# Patient Record
Sex: MALE | Race: WHITE | NOT HISPANIC OR LATINO | Employment: OTHER
[De-identification: names, ages, dates, MRNs, and addresses within clinical notes are randomized per-mention and may not be internally consistent; named-entity substitution may affect disease eponyms.]

---

## 2017-01-25 ENCOUNTER — RECORDS - HEALTHEAST (OUTPATIENT)
Dept: ADMINISTRATIVE | Facility: OTHER | Age: 72
End: 2017-01-25

## 2017-02-22 ENCOUNTER — AMBULATORY - HEALTHEAST (OUTPATIENT)
Dept: INTERNAL MEDICINE | Facility: CLINIC | Age: 72
End: 2017-02-22

## 2017-05-24 ENCOUNTER — AMBULATORY - HEALTHEAST (OUTPATIENT)
Dept: INTERNAL MEDICINE | Facility: CLINIC | Age: 72
End: 2017-05-24

## 2017-06-07 ENCOUNTER — AMBULATORY - HEALTHEAST (OUTPATIENT)
Dept: INTERNAL MEDICINE | Facility: CLINIC | Age: 72
End: 2017-06-07

## 2017-06-19 ENCOUNTER — AMBULATORY - HEALTHEAST (OUTPATIENT)
Dept: INTERNAL MEDICINE | Facility: CLINIC | Age: 72
End: 2017-06-19

## 2017-06-19 DIAGNOSIS — R06.00 DYSPNEA: ICD-10-CM

## 2017-06-19 DIAGNOSIS — E78.5 HYPERLIPIDEMIA: ICD-10-CM

## 2017-06-28 ENCOUNTER — HOSPITAL ENCOUNTER (OUTPATIENT)
Dept: CARDIOLOGY | Facility: HOSPITAL | Age: 72
Discharge: HOME | End: 2017-06-28
Attending: INTERNAL MEDICINE

## 2017-06-28 ENCOUNTER — HOSPITAL ENCOUNTER (OUTPATIENT)
Dept: NUCLEAR MEDICINE | Facility: HOSPITAL | Age: 72
Discharge: HOME | End: 2017-06-28
Attending: INTERNAL MEDICINE

## 2017-06-28 DIAGNOSIS — R06.00 DYSPNEA: ICD-10-CM

## 2017-06-28 LAB
CV STRESS CURRENT BP HE: NORMAL
CV STRESS CURRENT HR HE: 100
CV STRESS CURRENT HR HE: 100
CV STRESS CURRENT HR HE: 112
CV STRESS CURRENT HR HE: 113
CV STRESS CURRENT HR HE: 114
CV STRESS CURRENT HR HE: 120
CV STRESS CURRENT HR HE: 121
CV STRESS CURRENT HR HE: 124
CV STRESS CURRENT HR HE: 124
CV STRESS CURRENT HR HE: 127
CV STRESS CURRENT HR HE: 129
CV STRESS CURRENT HR HE: 140
CV STRESS CURRENT HR HE: 141
CV STRESS CURRENT HR HE: 141
CV STRESS CURRENT HR HE: 150
CV STRESS CURRENT HR HE: 152
CV STRESS CURRENT HR HE: 153
CV STRESS CURRENT HR HE: 153
CV STRESS CURRENT HR HE: 154
CV STRESS CURRENT HR HE: 154
CV STRESS CURRENT HR HE: 68
CV STRESS CURRENT HR HE: 77
CV STRESS CURRENT HR HE: 82
CV STRESS CURRENT HR HE: 83
CV STRESS CURRENT HR HE: 86
CV STRESS CURRENT HR HE: 87
CV STRESS CURRENT HR HE: 94
CV STRESS CURRENT HR HE: 97
CV STRESS CURRENT HR HE: 97
CV STRESS CURRENT HR HE: 99
CV STRESS CURRENT HR HE: 99
CV STRESS DEVIATION TIME HE: NORMAL
CV STRESS ECHO PERCENT HR HE: NORMAL
CV STRESS EXERCISE STAGE HE: NORMAL
CV STRESS EXERCISE STAGE REACHED HE: NORMAL
CV STRESS FINAL RESTING BP HE: NORMAL
CV STRESS FINAL RESTING HR HE: 82
CV STRESS MAX HR HE: 154
CV STRESS MAX TREADMILL GRADE HE: 16
CV STRESS MAX TREADMILL SPEED HE: 4.2
CV STRESS PEAK DIA BP HE: NORMAL
CV STRESS PEAK SYS BP HE: NORMAL
CV STRESS PHASE HE: NORMAL
CV STRESS PROTOCOL HE: NORMAL
CV STRESS RESTING PT POSITION HE: NORMAL
CV STRESS RESTING PT POSITION HE: NORMAL
CV STRESS ST DEVIATION AMOUNT HE: NORMAL
CV STRESS ST DEVIATION ELEVATION HE: NORMAL
CV STRESS ST EVELATION AMOUNT HE: NORMAL
CV STRESS TEST TYPE HE: NORMAL
CV STRESS TOTAL STAGE TIME MIN 1 HE: NORMAL
NUC STRESS EJECTION FRACTION: 69 %
STRESS ECHO BASELINE BP: NORMAL
STRESS ECHO BASELINE HR: 70
STRESS ECHO CALCULATED PERCENT HR: 102 %
STRESS ECHO LAST STRESS BP: NORMAL
STRESS ECHO LAST STRESS HR: 153
STRESS ECHO POST ESTIMATED WORKLOAD: 12.1
STRESS ECHO POST EXERCISE DUR MIN: 11
STRESS ECHO POST EXERCISE DUR SEC: 59
STRESS ECHO TARGET HR: 128

## 2017-07-12 ENCOUNTER — AMBULATORY - HEALTHEAST (OUTPATIENT)
Dept: INTERNAL MEDICINE | Facility: CLINIC | Age: 72
End: 2017-07-12

## 2017-07-12 DIAGNOSIS — Z00.00 PREVENTATIVE HEALTH CARE: ICD-10-CM

## 2017-08-02 ENCOUNTER — AMBULATORY - HEALTHEAST (OUTPATIENT)
Dept: LAB | Facility: CLINIC | Age: 72
End: 2017-08-02

## 2017-08-02 DIAGNOSIS — E78.5 HYPERLIPIDEMIA: ICD-10-CM

## 2017-08-02 DIAGNOSIS — Z00.00 PREVENTATIVE HEALTH CARE: ICD-10-CM

## 2017-08-02 LAB
CHOLEST SERPL-MCNC: 234 MG/DL
FASTING STATUS PATIENT QL REPORTED: NO
HDLC SERPL-MCNC: 65 MG/DL
LDLC SERPL CALC-MCNC: 148 MG/DL
TRIGL SERPL-MCNC: 107 MG/DL

## 2017-08-03 ENCOUNTER — COMMUNICATION - HEALTHEAST (OUTPATIENT)
Dept: INTERNAL MEDICINE | Facility: CLINIC | Age: 72
End: 2017-08-03

## 2017-08-03 ENCOUNTER — AMBULATORY - HEALTHEAST (OUTPATIENT)
Dept: INTERNAL MEDICINE | Facility: CLINIC | Age: 72
End: 2017-08-03

## 2017-09-11 ENCOUNTER — AMBULATORY - HEALTHEAST (OUTPATIENT)
Dept: INTERNAL MEDICINE | Facility: CLINIC | Age: 72
End: 2017-09-11

## 2017-09-26 ENCOUNTER — OFFICE VISIT - HEALTHEAST (OUTPATIENT)
Dept: CARDIOLOGY | Facility: CLINIC | Age: 72
End: 2017-09-26

## 2017-09-26 DIAGNOSIS — I10 ESSENTIAL HYPERTENSION WITH GOAL BLOOD PRESSURE LESS THAN 140/90: ICD-10-CM

## 2017-09-26 DIAGNOSIS — E78.00 HYPERCHOLESTEROLEMIA: ICD-10-CM

## 2017-09-26 ASSESSMENT — MIFFLIN-ST. JEOR: SCORE: 1492.89

## 2017-09-27 ENCOUNTER — AMBULATORY - HEALTHEAST (OUTPATIENT)
Dept: CARDIOLOGY | Facility: CLINIC | Age: 72
End: 2017-09-27

## 2017-09-27 ENCOUNTER — HOSPITAL ENCOUNTER (OUTPATIENT)
Dept: CT IMAGING | Facility: CLINIC | Age: 72
Discharge: HOME | End: 2017-09-27
Attending: INTERNAL MEDICINE

## 2017-09-27 DIAGNOSIS — E78.00 HYPERCHOLESTEROLEMIA: ICD-10-CM

## 2017-09-27 DIAGNOSIS — I25.10 CAD (CORONARY ARTERY DISEASE): ICD-10-CM

## 2017-09-27 DIAGNOSIS — E78.00 HYPERCHOLESTEREMIA: ICD-10-CM

## 2017-09-27 LAB
BSA FOR ECHO PROCEDURE: 1.91 M2
CV CALCIUM SCORE AGATSTON LM: 0
CV CALCIUM SCORING AGATSON LAD: 43
CV CALCIUM SCORING AGATSTON CX: 1
CV CALCIUM SCORING AGATSTON RCA: 46
CV CALCIUM SCORING AGATSTON TOTAL: 90

## 2017-09-27 ASSESSMENT — MIFFLIN-ST. JEOR: SCORE: 1488.35

## 2017-11-06 ENCOUNTER — AMBULATORY - HEALTHEAST (OUTPATIENT)
Dept: INTERNAL MEDICINE | Facility: CLINIC | Age: 72
End: 2017-11-06

## 2017-11-07 ENCOUNTER — OFFICE VISIT - HEALTHEAST (OUTPATIENT)
Dept: INTERNAL MEDICINE | Facility: CLINIC | Age: 72
End: 2017-11-07

## 2017-11-07 DIAGNOSIS — R20.8 DYSESTHESIA: ICD-10-CM

## 2017-11-07 DIAGNOSIS — I71.21 ANEURYSM, ASCENDING AORTA (H): ICD-10-CM

## 2017-11-07 DIAGNOSIS — K63.5 COLON POLYP: ICD-10-CM

## 2017-11-07 DIAGNOSIS — I10 HTN (HYPERTENSION): ICD-10-CM

## 2017-11-07 DIAGNOSIS — F51.04 CHRONIC INSOMNIA: ICD-10-CM

## 2017-11-07 DIAGNOSIS — M54.2 NECK PAIN: ICD-10-CM

## 2017-11-07 DIAGNOSIS — M25.512 SHOULDER PAIN, LEFT: ICD-10-CM

## 2017-11-07 DIAGNOSIS — Z83.79 FAMILY HISTORY OF GASTRIC ULCER: ICD-10-CM

## 2017-11-07 DIAGNOSIS — E78.5 HYPERLIPIDEMIA: ICD-10-CM

## 2017-11-07 DIAGNOSIS — C61 PROSTATE CANCER (H): ICD-10-CM

## 2017-11-07 LAB
CHOLEST SERPL-MCNC: 168 MG/DL
FASTING STATUS PATIENT QL REPORTED: YES
HDLC SERPL-MCNC: 54 MG/DL
LDLC SERPL CALC-MCNC: 98 MG/DL
TRIGL SERPL-MCNC: 79 MG/DL

## 2017-11-07 ASSESSMENT — MIFFLIN-ST. JEOR: SCORE: 1479.85

## 2017-11-13 ENCOUNTER — RECORDS - HEALTHEAST (OUTPATIENT)
Dept: ADMINISTRATIVE | Facility: OTHER | Age: 72
End: 2017-11-13

## 2017-12-28 ENCOUNTER — RECORDS - HEALTHEAST (OUTPATIENT)
Dept: ADMINISTRATIVE | Facility: OTHER | Age: 72
End: 2017-12-28

## 2018-03-01 ENCOUNTER — RECORDS - HEALTHEAST (OUTPATIENT)
Dept: ADMINISTRATIVE | Facility: OTHER | Age: 73
End: 2018-03-01

## 2018-04-04 ENCOUNTER — OFFICE VISIT - HEALTHEAST (OUTPATIENT)
Dept: INTERNAL MEDICINE | Facility: CLINIC | Age: 73
End: 2018-04-04

## 2018-04-04 DIAGNOSIS — F51.04 CHRONIC INSOMNIA: ICD-10-CM

## 2018-04-04 DIAGNOSIS — N52.9 ERECTILE DYSFUNCTION: ICD-10-CM

## 2018-04-04 DIAGNOSIS — G47.00 INSOMNIA: ICD-10-CM

## 2018-04-04 DIAGNOSIS — I10 ESSENTIAL HYPERTENSION: ICD-10-CM

## 2018-04-04 DIAGNOSIS — M25.559 HIP PAIN: ICD-10-CM

## 2018-04-04 DIAGNOSIS — R10.13 DYSPEPSIA: ICD-10-CM

## 2018-04-04 LAB
ANION GAP SERPL CALCULATED.3IONS-SCNC: 10 MMOL/L (ref 5–18)
BASOPHILS # BLD AUTO: 0.1 THOU/UL (ref 0–0.2)
BASOPHILS NFR BLD AUTO: 1 % (ref 0–2)
BUN SERPL-MCNC: 18 MG/DL (ref 8–28)
CALCIUM SERPL-MCNC: 9.3 MG/DL (ref 8.5–10.5)
CHLORIDE BLD-SCNC: 108 MMOL/L (ref 98–107)
CO2 SERPL-SCNC: 24 MMOL/L (ref 22–31)
CREAT SERPL-MCNC: 0.8 MG/DL (ref 0.7–1.3)
EOSINOPHIL # BLD AUTO: 0.1 THOU/UL (ref 0–0.4)
EOSINOPHIL NFR BLD AUTO: 1 % (ref 0–6)
ERYTHROCYTE [DISTWIDTH] IN BLOOD BY AUTOMATED COUNT: 12.2 % (ref 11–14.5)
GFR SERPL CREATININE-BSD FRML MDRD: >60 ML/MIN/1.73M2
GLUCOSE BLD-MCNC: 88 MG/DL (ref 70–125)
HCT VFR BLD AUTO: 38.2 % (ref 40–54)
HGB BLD-MCNC: 12.8 G/DL (ref 14–18)
LYMPHOCYTES # BLD AUTO: 1.5 THOU/UL (ref 0.8–4.4)
LYMPHOCYTES NFR BLD AUTO: 14 % (ref 20–40)
MCH RBC QN AUTO: 31 PG (ref 27–34)
MCHC RBC AUTO-ENTMCNC: 33.5 G/DL (ref 32–36)
MCV RBC AUTO: 93 FL (ref 80–100)
MONOCYTES # BLD AUTO: 0.9 THOU/UL (ref 0–0.9)
MONOCYTES NFR BLD AUTO: 9 % (ref 2–10)
NEUTROPHILS # BLD AUTO: 7.9 THOU/UL (ref 2–7.7)
NEUTROPHILS NFR BLD AUTO: 76 % (ref 50–70)
PLATELET # BLD AUTO: 254 THOU/UL (ref 140–440)
PMV BLD AUTO: 7.4 FL (ref 7–10)
POTASSIUM BLD-SCNC: 3.9 MMOL/L (ref 3.5–5)
RBC # BLD AUTO: 4.12 MILL/UL (ref 4.4–6.2)
SODIUM SERPL-SCNC: 142 MMOL/L (ref 136–145)
WBC: 10.4 THOU/UL (ref 4–11)

## 2018-04-05 LAB
ATRIAL RATE - MUSE: 73 BPM
DIASTOLIC BLOOD PRESSURE - MUSE: NORMAL MMHG
INTERPRETATION ECG - MUSE: NORMAL
P AXIS - MUSE: 39 DEGREES
PR INTERVAL - MUSE: 164 MS
QRS DURATION - MUSE: 94 MS
QT - MUSE: 380 MS
QTC - MUSE: 418 MS
R AXIS - MUSE: -5 DEGREES
SYSTOLIC BLOOD PRESSURE - MUSE: NORMAL MMHG
T AXIS - MUSE: 56 DEGREES
VENTRICULAR RATE- MUSE: 73 BPM

## 2018-06-04 ENCOUNTER — AMBULATORY - HEALTHEAST (OUTPATIENT)
Dept: INTERNAL MEDICINE | Facility: CLINIC | Age: 73
End: 2018-06-04

## 2018-06-22 ENCOUNTER — COMMUNICATION - HEALTHEAST (OUTPATIENT)
Dept: INTERNAL MEDICINE | Facility: CLINIC | Age: 73
End: 2018-06-22

## 2018-07-16 ENCOUNTER — AMBULATORY - HEALTHEAST (OUTPATIENT)
Dept: INTERNAL MEDICINE | Facility: CLINIC | Age: 73
End: 2018-07-16

## 2018-07-16 DIAGNOSIS — R25.2 MUSCLE CRAMP: ICD-10-CM

## 2018-07-20 ENCOUNTER — COMMUNICATION - HEALTHEAST (OUTPATIENT)
Dept: INTERNAL MEDICINE | Facility: CLINIC | Age: 73
End: 2018-07-20

## 2018-07-20 DIAGNOSIS — I10 ESSENTIAL HYPERTENSION: ICD-10-CM

## 2018-07-20 DIAGNOSIS — I71.21 THORACIC ASCENDING AORTIC ANEURYSM (H): ICD-10-CM

## 2018-08-23 ENCOUNTER — AMBULATORY - HEALTHEAST (OUTPATIENT)
Dept: INTERNAL MEDICINE | Facility: CLINIC | Age: 73
End: 2018-08-23

## 2018-08-23 DIAGNOSIS — M25.519 SHOULDER PAIN: ICD-10-CM

## 2018-08-28 ENCOUNTER — AMBULATORY - HEALTHEAST (OUTPATIENT)
Dept: CARDIOLOGY | Facility: CLINIC | Age: 73
End: 2018-08-28

## 2018-08-28 ENCOUNTER — RECORDS - HEALTHEAST (OUTPATIENT)
Dept: ADMINISTRATIVE | Facility: OTHER | Age: 73
End: 2018-08-28

## 2018-08-28 ENCOUNTER — COMMUNICATION - HEALTHEAST (OUTPATIENT)
Dept: SCHEDULING | Facility: CLINIC | Age: 73
End: 2018-08-28

## 2018-08-28 DIAGNOSIS — I77.810 MILD DILATION OF ASCENDING AORTA (H): ICD-10-CM

## 2018-09-04 ENCOUNTER — RECORDS - HEALTHEAST (OUTPATIENT)
Dept: ADMINISTRATIVE | Facility: OTHER | Age: 73
End: 2018-09-04

## 2018-09-05 ENCOUNTER — COMMUNICATION - HEALTHEAST (OUTPATIENT)
Dept: INTERNAL MEDICINE | Facility: CLINIC | Age: 73
End: 2018-09-05

## 2018-09-06 ENCOUNTER — OFFICE VISIT - HEALTHEAST (OUTPATIENT)
Dept: INTERNAL MEDICINE | Facility: CLINIC | Age: 73
End: 2018-09-06

## 2018-09-06 DIAGNOSIS — N20.0 NEPHROLITHIASIS: ICD-10-CM

## 2018-09-06 DIAGNOSIS — M75.100 ROTATOR CUFF TEAR: ICD-10-CM

## 2018-09-06 DIAGNOSIS — E78.00 HYPERCHOLESTEROLEMIA: ICD-10-CM

## 2018-09-06 DIAGNOSIS — I10 ESSENTIAL HYPERTENSION: ICD-10-CM

## 2018-09-06 LAB
ALBUMIN SERPL-MCNC: 3.9 G/DL (ref 3.5–5)
ANION GAP SERPL CALCULATED.3IONS-SCNC: 7 MMOL/L (ref 5–18)
BASOPHILS # BLD AUTO: 0.1 THOU/UL (ref 0–0.2)
BASOPHILS NFR BLD AUTO: 1 % (ref 0–2)
BUN SERPL-MCNC: 25 MG/DL (ref 8–28)
CALCIUM SERPL-MCNC: 9.5 MG/DL (ref 8.5–10.5)
CHLORIDE BLD-SCNC: 110 MMOL/L (ref 98–107)
CHOLEST SERPL-MCNC: 176 MG/DL
CO2 SERPL-SCNC: 25 MMOL/L (ref 22–31)
CREAT SERPL-MCNC: 0.79 MG/DL (ref 0.7–1.3)
EOSINOPHIL # BLD AUTO: 0.1 THOU/UL (ref 0–0.4)
EOSINOPHIL NFR BLD AUTO: 1 % (ref 0–6)
ERYTHROCYTE [DISTWIDTH] IN BLOOD BY AUTOMATED COUNT: 12.9 % (ref 11–14.5)
GFR SERPL CREATININE-BSD FRML MDRD: >60 ML/MIN/1.73M2
GLUCOSE BLD-MCNC: 100 MG/DL (ref 70–125)
HCT VFR BLD AUTO: 39.5 % (ref 40–54)
HGB BLD-MCNC: 13.3 G/DL (ref 14–18)
LYMPHOCYTES # BLD AUTO: 1.9 THOU/UL (ref 0.8–4.4)
LYMPHOCYTES NFR BLD AUTO: 24 % (ref 20–40)
MCH RBC QN AUTO: 31.2 PG (ref 27–34)
MCHC RBC AUTO-ENTMCNC: 33.7 G/DL (ref 32–36)
MCV RBC AUTO: 93 FL (ref 80–100)
MONOCYTES # BLD AUTO: 0.6 THOU/UL (ref 0–0.9)
MONOCYTES NFR BLD AUTO: 8 % (ref 2–10)
NEUTROPHILS # BLD AUTO: 5.5 THOU/UL (ref 2–7.7)
NEUTROPHILS NFR BLD AUTO: 67 % (ref 50–70)
PHOSPHATE SERPL-MCNC: 2.9 MG/DL (ref 2.5–4.5)
PLATELET # BLD AUTO: 285 THOU/UL (ref 140–440)
PMV BLD AUTO: 7.8 FL (ref 7–10)
POTASSIUM BLD-SCNC: 4.1 MMOL/L (ref 3.5–5)
RBC # BLD AUTO: 4.26 MILL/UL (ref 4.4–6.2)
SODIUM SERPL-SCNC: 142 MMOL/L (ref 136–145)
WBC: 8.2 THOU/UL (ref 4–11)

## 2018-09-06 ASSESSMENT — MIFFLIN-ST. JEOR: SCORE: 1470.95

## 2018-10-15 ENCOUNTER — AMBULATORY - HEALTHEAST (OUTPATIENT)
Dept: INTERNAL MEDICINE | Facility: CLINIC | Age: 73
End: 2018-10-15

## 2018-10-15 DIAGNOSIS — I25.10 CAD (CORONARY ARTERY DISEASE): ICD-10-CM

## 2018-10-15 DIAGNOSIS — I10 BENIGN ESSENTIAL HYPERTENSION: ICD-10-CM

## 2018-10-15 DIAGNOSIS — E78.00 HYPERCHOLESTEREMIA: ICD-10-CM

## 2018-11-05 ENCOUNTER — AMBULATORY - HEALTHEAST (OUTPATIENT)
Dept: INTERNAL MEDICINE | Facility: CLINIC | Age: 73
End: 2018-11-05

## 2018-11-05 DIAGNOSIS — M53.3 SACRAL PAIN: ICD-10-CM

## 2018-11-05 DIAGNOSIS — R20.8 DYSESTHESIA: ICD-10-CM

## 2018-11-05 DIAGNOSIS — M79.18 BUTTOCK PAIN: ICD-10-CM

## 2018-11-13 ENCOUNTER — AMBULATORY - HEALTHEAST (OUTPATIENT)
Dept: INTERNAL MEDICINE | Facility: CLINIC | Age: 73
End: 2018-11-13

## 2018-11-13 DIAGNOSIS — R10.2 PELVIC PAIN IN MALE: ICD-10-CM

## 2018-11-13 DIAGNOSIS — M54.10 RADICULAR LEG PAIN: ICD-10-CM

## 2018-12-03 ENCOUNTER — COMMUNICATION - HEALTHEAST (OUTPATIENT)
Dept: INTERNAL MEDICINE | Facility: CLINIC | Age: 73
End: 2018-12-03

## 2018-12-04 ENCOUNTER — RECORDS - HEALTHEAST (OUTPATIENT)
Dept: ADMINISTRATIVE | Facility: OTHER | Age: 73
End: 2018-12-04

## 2018-12-21 ENCOUNTER — AMBULATORY - HEALTHEAST (OUTPATIENT)
Dept: INTERNAL MEDICINE | Facility: CLINIC | Age: 73
End: 2018-12-21

## 2018-12-21 DIAGNOSIS — G47.00 INSOMNIA: ICD-10-CM

## 2018-12-21 DIAGNOSIS — F51.04 CHRONIC INSOMNIA: ICD-10-CM

## 2018-12-26 ENCOUNTER — COMMUNICATION - HEALTHEAST (OUTPATIENT)
Dept: INTERNAL MEDICINE | Facility: CLINIC | Age: 73
End: 2018-12-26

## 2018-12-26 DIAGNOSIS — F51.04 CHRONIC INSOMNIA: ICD-10-CM

## 2019-03-26 ENCOUNTER — AMBULATORY - HEALTHEAST (OUTPATIENT)
Dept: INTERNAL MEDICINE | Facility: CLINIC | Age: 74
End: 2019-03-26

## 2019-03-26 DIAGNOSIS — E78.00 HYPERCHOLESTEREMIA: ICD-10-CM

## 2019-03-26 DIAGNOSIS — I25.10 CAD (CORONARY ARTERY DISEASE): ICD-10-CM

## 2019-03-26 DIAGNOSIS — I10 ESSENTIAL HYPERTENSION: ICD-10-CM

## 2019-04-10 ENCOUNTER — AMBULATORY - HEALTHEAST (OUTPATIENT)
Dept: INTERNAL MEDICINE | Facility: CLINIC | Age: 74
End: 2019-04-10

## 2019-04-10 DIAGNOSIS — F51.04 CHRONIC INSOMNIA: ICD-10-CM

## 2019-04-10 DIAGNOSIS — G47.00 INSOMNIA: ICD-10-CM

## 2019-04-24 ENCOUNTER — RECORDS - HEALTHEAST (OUTPATIENT)
Dept: ADMINISTRATIVE | Facility: OTHER | Age: 74
End: 2019-04-24

## 2019-04-27 ENCOUNTER — AMBULATORY - HEALTHEAST (OUTPATIENT)
Dept: INTERNAL MEDICINE | Facility: CLINIC | Age: 74
End: 2019-04-27

## 2019-04-27 DIAGNOSIS — M16.11 PRIMARY OSTEOARTHRITIS OF RIGHT HIP: ICD-10-CM

## 2019-04-27 DIAGNOSIS — E78.00 HYPERCHOLESTEROLEMIA: ICD-10-CM

## 2019-04-27 DIAGNOSIS — I10 ESSENTIAL HYPERTENSION: ICD-10-CM

## 2019-04-27 DIAGNOSIS — Z85.46 HISTORY OF PROSTATE CANCER: ICD-10-CM

## 2019-04-29 ENCOUNTER — AMBULATORY - HEALTHEAST (OUTPATIENT)
Dept: INTERNAL MEDICINE | Facility: CLINIC | Age: 74
End: 2019-04-29

## 2019-04-29 DIAGNOSIS — I10 ESSENTIAL HYPERTENSION: ICD-10-CM

## 2019-04-30 ENCOUNTER — AMBULATORY - HEALTHEAST (OUTPATIENT)
Dept: LAB | Facility: CLINIC | Age: 74
End: 2019-04-30

## 2019-04-30 DIAGNOSIS — Z85.46 HISTORY OF PROSTATE CANCER: ICD-10-CM

## 2019-04-30 DIAGNOSIS — E78.00 HYPERCHOLESTEROLEMIA: ICD-10-CM

## 2019-04-30 DIAGNOSIS — I10 ESSENTIAL HYPERTENSION: ICD-10-CM

## 2019-04-30 LAB
ALBUMIN SERPL-MCNC: 4.1 G/DL (ref 3.5–5)
ALBUMIN UR-MCNC: ABNORMAL MG/DL
ALP SERPL-CCNC: 63 U/L (ref 45–120)
ALT SERPL W P-5'-P-CCNC: 14 U/L (ref 0–45)
ANION GAP SERPL CALCULATED.3IONS-SCNC: 8 MMOL/L (ref 5–18)
APPEARANCE UR: ABNORMAL
AST SERPL W P-5'-P-CCNC: 23 U/L (ref 0–40)
BACTERIA #/AREA URNS HPF: ABNORMAL HPF
BASOPHILS # BLD AUTO: 0 THOU/UL (ref 0–0.2)
BASOPHILS NFR BLD AUTO: 0 % (ref 0–2)
BILIRUB DIRECT SERPL-MCNC: 0.2 MG/DL
BILIRUB SERPL-MCNC: 0.5 MG/DL (ref 0–1)
BILIRUB UR QL STRIP: NEGATIVE
BUN SERPL-MCNC: 28 MG/DL (ref 8–28)
CALCIUM SERPL-MCNC: 10 MG/DL (ref 8.5–10.5)
CHLORIDE BLD-SCNC: 105 MMOL/L (ref 98–107)
CHOLEST SERPL-MCNC: 210 MG/DL
CO2 SERPL-SCNC: 28 MMOL/L (ref 22–31)
COLOR UR AUTO: ABNORMAL
CREAT SERPL-MCNC: 1 MG/DL (ref 0.7–1.3)
EOSINOPHIL # BLD AUTO: 0.1 THOU/UL (ref 0–0.4)
EOSINOPHIL NFR BLD AUTO: 2 % (ref 0–6)
ERYTHROCYTE [DISTWIDTH] IN BLOOD BY AUTOMATED COUNT: 13 % (ref 11–14.5)
ERYTHROCYTE [SEDIMENTATION RATE] IN BLOOD BY WESTERGREN METHOD: 3 MM/HR (ref 0–15)
FASTING STATUS PATIENT QL REPORTED: YES
GFR SERPL CREATININE-BSD FRML MDRD: >60 ML/MIN/1.73M2
GLUCOSE BLD-MCNC: 106 MG/DL (ref 70–125)
GLUCOSE UR STRIP-MCNC: NEGATIVE MG/DL
HCT VFR BLD AUTO: 43.1 % (ref 40–54)
HDLC SERPL-MCNC: 69 MG/DL
HGB BLD-MCNC: 14.4 G/DL (ref 14–18)
HGB UR QL STRIP: ABNORMAL
KETONES UR STRIP-MCNC: NEGATIVE MG/DL
LDLC SERPL CALC-MCNC: 127 MG/DL
LEUKOCYTE ESTERASE UR QL STRIP: NEGATIVE
LYMPHOCYTES # BLD AUTO: 2.6 THOU/UL (ref 0.8–4.4)
LYMPHOCYTES NFR BLD AUTO: 35 % (ref 20–40)
MCH RBC QN AUTO: 31.4 PG (ref 27–34)
MCHC RBC AUTO-ENTMCNC: 33.4 G/DL (ref 32–36)
MCV RBC AUTO: 94 FL (ref 80–100)
MONOCYTES # BLD AUTO: 0.7 THOU/UL (ref 0–0.9)
MONOCYTES NFR BLD AUTO: 10 % (ref 2–10)
MUCOUS THREADS #/AREA URNS LPF: ABNORMAL LPF
NEUTROPHILS # BLD AUTO: 3.9 THOU/UL (ref 2–7.7)
NEUTROPHILS NFR BLD AUTO: 53 % (ref 50–70)
NITRATE UR QL: NEGATIVE
PH UR STRIP: 6 [PH] (ref 4.5–8)
PLATELET # BLD AUTO: 306 THOU/UL (ref 140–440)
PMV BLD AUTO: 9.6 FL (ref 8.5–12.5)
POTASSIUM BLD-SCNC: 4.1 MMOL/L (ref 3.5–5)
PROT SERPL-MCNC: 6.7 G/DL (ref 6–8)
PSA SERPL-MCNC: <0.1 NG/ML (ref 0–6.5)
RBC # BLD AUTO: 4.58 MILL/UL (ref 4.4–6.2)
RBC #/AREA URNS AUTO: >100 HPF
SODIUM SERPL-SCNC: 141 MMOL/L (ref 136–145)
SP GR UR STRIP: 1.01 (ref 1–1.03)
SQUAMOUS #/AREA URNS AUTO: ABNORMAL LPF
TRIGL SERPL-MCNC: 71 MG/DL
TSH SERPL DL<=0.005 MIU/L-ACNC: 1.83 UIU/ML (ref 0.3–5)
UROBILINOGEN UR STRIP-ACNC: ABNORMAL
WBC #/AREA URNS AUTO: ABNORMAL HPF
WBC: 7.4 THOU/UL (ref 4–11)

## 2019-05-02 ENCOUNTER — COMMUNICATION - HEALTHEAST (OUTPATIENT)
Dept: INTERNAL MEDICINE | Facility: CLINIC | Age: 74
End: 2019-05-02

## 2019-05-08 ENCOUNTER — OFFICE VISIT - HEALTHEAST (OUTPATIENT)
Dept: INTERNAL MEDICINE | Facility: CLINIC | Age: 74
End: 2019-05-08

## 2019-05-08 DIAGNOSIS — M16.11 OSTEOARTHRITIS OF RIGHT HIP, UNSPECIFIED OSTEOARTHRITIS TYPE: ICD-10-CM

## 2019-05-08 DIAGNOSIS — E78.00 HYPERCHOLESTEREMIA: ICD-10-CM

## 2019-05-08 DIAGNOSIS — Z01.818 PREOPERATIVE EXAMINATION: ICD-10-CM

## 2019-05-08 DIAGNOSIS — N20.0 NEPHROLITHIASIS: ICD-10-CM

## 2019-05-08 DIAGNOSIS — I10 ESSENTIAL HYPERTENSION: ICD-10-CM

## 2019-05-08 DIAGNOSIS — G47.00 INSOMNIA, UNSPECIFIED TYPE: ICD-10-CM

## 2019-05-08 DIAGNOSIS — R31.29 HEMATURIA, MICROSCOPIC: ICD-10-CM

## 2019-05-08 LAB
ALBUMIN UR-MCNC: NEGATIVE MG/DL
APPEARANCE UR: CLEAR
ATRIAL RATE - MUSE: 71 BPM
BILIRUB UR QL STRIP: NEGATIVE
COLOR UR AUTO: YELLOW
DIASTOLIC BLOOD PRESSURE - MUSE: NORMAL MMHG
GLUCOSE UR STRIP-MCNC: NEGATIVE MG/DL
HGB UR QL STRIP: NEGATIVE
INTERPRETATION ECG - MUSE: NORMAL
KETONES UR STRIP-MCNC: NEGATIVE MG/DL
LEUKOCYTE ESTERASE UR QL STRIP: NEGATIVE
NITRATE UR QL: NEGATIVE
P AXIS - MUSE: 74 DEGREES
PH UR STRIP: 7 [PH] (ref 5–8)
PR INTERVAL - MUSE: 170 MS
QRS DURATION - MUSE: 94 MS
QT - MUSE: 390 MS
QTC - MUSE: 423 MS
R AXIS - MUSE: -17 DEGREES
SP GR UR STRIP: 1.01 (ref 1–1.03)
SYSTOLIC BLOOD PRESSURE - MUSE: NORMAL MMHG
T AXIS - MUSE: 57 DEGREES
UROBILINOGEN UR STRIP-ACNC: NORMAL
VENTRICULAR RATE- MUSE: 71 BPM

## 2019-05-08 ASSESSMENT — MIFFLIN-ST. JEOR: SCORE: 1479.85

## 2019-07-12 ENCOUNTER — AMBULATORY - HEALTHEAST (OUTPATIENT)
Dept: ADMINISTRATIVE | Facility: REHABILITATION | Age: 74
End: 2019-07-12

## 2019-07-12 ENCOUNTER — RECORDS - HEALTHEAST (OUTPATIENT)
Dept: ADMINISTRATIVE | Facility: OTHER | Age: 74
End: 2019-07-12

## 2019-07-12 DIAGNOSIS — R41.89 COGNITIVE DECLINE: ICD-10-CM

## 2019-07-12 DIAGNOSIS — I63.9 RIGHT SIDED CEREBRAL INFARCTION (H): ICD-10-CM

## 2019-07-12 DIAGNOSIS — M54.16 RIGHT LUMBAR RADICULOPATHY: ICD-10-CM

## 2019-07-23 ENCOUNTER — OFFICE VISIT - HEALTHEAST (OUTPATIENT)
Dept: PHYSICAL THERAPY | Facility: REHABILITATION | Age: 74
End: 2019-07-23

## 2019-07-23 DIAGNOSIS — M25.551 RIGHT HIP PAIN: ICD-10-CM

## 2019-07-23 DIAGNOSIS — M54.16 RIGHT LUMBAR RADICULOPATHY: ICD-10-CM

## 2019-07-26 ENCOUNTER — HOSPITAL ENCOUNTER (OUTPATIENT)
Dept: CARDIOLOGY | Facility: CLINIC | Age: 74
Discharge: HOME | End: 2019-07-26
Attending: PSYCHIATRY & NEUROLOGY

## 2019-07-26 DIAGNOSIS — R41.89 COGNITIVE DECLINE: ICD-10-CM

## 2019-07-26 DIAGNOSIS — I63.9 RIGHT SIDED CEREBRAL INFARCTION (H): ICD-10-CM

## 2019-07-26 DIAGNOSIS — M54.16 RIGHT LUMBAR RADICULOPATHY: ICD-10-CM

## 2019-07-26 ASSESSMENT — MIFFLIN-ST. JEOR: SCORE: 1479.85

## 2019-07-29 LAB
AORTIC VALVE MEAN VELOCITY: 111 CM/S
AR DECEL SLOPE: 1470 MM/S2
AR PEAK VELOCITY: 403 CM/S
AV DIMENSIONLESS INDEX VTI: 0.9
AV MEAN GRADIENT: 5 MMHG
AV PEAK GRADIENT: 8.6 MMHG
AV REGURGITANT PEAK GRADIENT: 65 MMHG
AV REGURGITATION PRESSURE HALF TIME: 802 MS
AV VALVE AREA: 2.8 CM2
AV VELOCITY RATIO: 0.9
BSA FOR ECHO PROCEDURE: 1.9 M2
CV BLOOD PRESSURE: ABNORMAL MMHG
CV ECHO HEIGHT: 68.8 IN
CV ECHO WEIGHT: 165 LBS
DOP CALC AO PEAK VEL: 147 CM/S
DOP CALC AO VTI: 32.5 CM
DOP CALC LVOT AREA: 3.14 CM2
DOP CALC LVOT DIAMETER: 2 CM
DOP CALC LVOT PEAK VEL: 131 CM/S
DOP CALC LVOT STROKE VOLUME: 92 CM3
DOP CALCLVOT PEAK VEL VTI: 29.3 CM
EJECTION FRACTION: 53 % (ref 55–75)
FRACTIONAL SHORTENING: 29.1 % (ref 28–44)
INTERVENTRICULAR SEPTUM IN END DIASTOLE: 0.96 CM (ref 0.6–1)
IVS/PW RATIO: 1.1
LA AREA 1: 12.5 CM2
LA AREA 2: 14 CM2
LEFT ATRIUM LENGTH: 4.4 CM
LEFT ATRIUM SIZE: 2.9 CM
LEFT ATRIUM VOLUME INDEX: 17.8 ML/M2
LEFT ATRIUM VOLUME: 33.8 ML
LEFT VENTRICLE DIASTOLIC VOLUME INDEX: 41.9 CM3/M2 (ref 34–74)
LEFT VENTRICLE DIASTOLIC VOLUME: 79.7 CM3 (ref 62–150)
LEFT VENTRICLE MASS INDEX: 57.9 G/M2
LEFT VENTRICLE SYSTOLIC VOLUME INDEX: 19.8 CM3/M2 (ref 11–31)
LEFT VENTRICLE SYSTOLIC VOLUME: 37.7 CM3 (ref 21–61)
LEFT VENTRICULAR INTERNAL DIMENSION IN DIASTOLE: 3.98 CM (ref 4.2–5.8)
LEFT VENTRICULAR INTERNAL DIMENSION IN SYSTOLE: 2.82 CM (ref 2.5–4)
LEFT VENTRICULAR MASS: 110.1 G
LEFT VENTRICULAR OUTFLOW TRACT MEAN GRADIENT: 5 MMHG
LEFT VENTRICULAR OUTFLOW TRACT MEAN VELOCITY: 103 CM/S
LEFT VENTRICULAR OUTFLOW TRACT PEAK GRADIENT: 7 MMHG
LEFT VENTRICULAR POSTERIOR WALL IN END DIASTOLE: 0.85 CM (ref 0.6–1)
LV STROKE VOLUME INDEX: 48.4 ML/M2
MITRAL VALVE E/A RATIO: 0.7
MV AVERAGE E/E' RATIO: 8.7 CM/S
MV DECELERATION TIME: 306 MS
MV E'TISSUE VEL-LAT: 6.87 CM/S
MV E'TISSUE VEL-MED: 5.13 CM/S
MV LATERAL E/E' RATIO: 7.6
MV MEDIAL E/E' RATIO: 10.2
MV PEAK A VELOCITY: 75.2 CM/S
MV PEAK E VELOCITY: 52.4 CM/S
NUC REST DIASTOLIC VOLUME INDEX: 2640 LBS
NUC REST SYSTOLIC VOLUME INDEX: 68.75 IN
TRICUSPID REGURGITATION PEAK PRESSURE GRADIENT: 19.9 MMHG
TRICUSPID VALVE ANULAR PLANE SYSTOLIC EXCURSION: 2.1 CM
TRICUSPID VALVE PEAK REGURGITANT VELOCITY: 223 CM/S

## 2019-07-31 ENCOUNTER — RECORDS - HEALTHEAST (OUTPATIENT)
Dept: LAB | Facility: CLINIC | Age: 74
End: 2019-07-31

## 2019-07-31 LAB
FOLATE SERPL-MCNC: 13.9 NG/ML
LYME TOTAL ANTIBODY - HISTORICAL: 0.03 INDEX VALUE
TSH SERPL DL<=0.005 MIU/L-ACNC: 1.49 UIU/ML (ref 0.3–5)
VIT B12 SERPL-MCNC: 297 PG/ML (ref 213–816)

## 2019-08-02 LAB — METHYLMALONATE SERPL-SCNC: 0.15 UMOL/L (ref 0–0.4)

## 2019-08-08 ENCOUNTER — RECORDS - HEALTHEAST (OUTPATIENT)
Dept: ADMINISTRATIVE | Facility: OTHER | Age: 74
End: 2019-08-08

## 2019-10-18 ENCOUNTER — AMBULATORY - HEALTHEAST (OUTPATIENT)
Dept: INTERNAL MEDICINE | Facility: CLINIC | Age: 74
End: 2019-10-18

## 2019-10-18 DIAGNOSIS — G47.00 INSOMNIA: ICD-10-CM

## 2019-10-18 DIAGNOSIS — E78.00 HYPERCHOLESTEREMIA: ICD-10-CM

## 2019-10-18 DIAGNOSIS — I10 ESSENTIAL HYPERTENSION: ICD-10-CM

## 2019-10-18 DIAGNOSIS — N52.9 ERECTILE DYSFUNCTION, UNSPECIFIED ERECTILE DYSFUNCTION TYPE: ICD-10-CM

## 2019-10-18 DIAGNOSIS — M54.2 NECK PAIN: ICD-10-CM

## 2019-10-18 DIAGNOSIS — F51.04 CHRONIC INSOMNIA: ICD-10-CM

## 2019-10-18 DIAGNOSIS — I25.10 CAD (CORONARY ARTERY DISEASE): ICD-10-CM

## 2019-10-18 RX ORDER — ATORVASTATIN CALCIUM 40 MG/1
40 TABLET, FILM COATED ORAL DAILY
Qty: 90 TABLET | Refills: 3 | Status: SHIPPED | OUTPATIENT
Start: 2019-10-18 | End: 2022-06-02

## 2019-10-18 RX ORDER — SILDENAFIL 100 MG/1
100 TABLET, FILM COATED ORAL PRN
Qty: 20 TABLET | Refills: 1 | Status: SHIPPED | OUTPATIENT
Start: 2019-10-18 | End: 2022-06-02 | Stop reason: ALTCHOICE

## 2019-10-31 ENCOUNTER — RECORDS - HEALTHEAST (OUTPATIENT)
Dept: ADMINISTRATIVE | Facility: OTHER | Age: 74
End: 2019-10-31

## 2019-11-06 ENCOUNTER — HOSPITAL ENCOUNTER (OUTPATIENT)
Dept: ULTRASOUND IMAGING | Facility: CLINIC | Age: 74
Discharge: HOME | End: 2019-11-06
Attending: SPECIALIST

## 2019-11-06 ENCOUNTER — COMMUNICATION - HEALTHEAST (OUTPATIENT)
Dept: TELEHEALTH | Facility: CLINIC | Age: 74
End: 2019-11-06

## 2019-11-06 DIAGNOSIS — R10.31 RIGHT GROIN PAIN: ICD-10-CM

## 2019-11-06 DIAGNOSIS — Z98.890 HX OF HERNIA REPAIR: ICD-10-CM

## 2019-11-06 DIAGNOSIS — Z87.19 HX OF HERNIA REPAIR: ICD-10-CM

## 2019-12-03 ENCOUNTER — RECORDS - HEALTHEAST (OUTPATIENT)
Dept: ADMINISTRATIVE | Facility: OTHER | Age: 74
End: 2019-12-03

## 2020-03-16 ENCOUNTER — RECORDS - HEALTHEAST (OUTPATIENT)
Dept: ADMINISTRATIVE | Facility: OTHER | Age: 75
End: 2020-03-16

## 2020-03-16 ENCOUNTER — RECORDS - HEALTHEAST (OUTPATIENT)
Dept: LAB | Facility: CLINIC | Age: 75
End: 2020-03-16

## 2020-03-16 LAB
ALBUMIN SERPL-MCNC: 3.8 G/DL (ref 3.5–5)
ALP SERPL-CCNC: 58 U/L (ref 45–120)
ALT SERPL W P-5'-P-CCNC: 11 U/L (ref 0–45)
ANION GAP SERPL CALCULATED.3IONS-SCNC: 12 MMOL/L (ref 5–18)
AST SERPL W P-5'-P-CCNC: 20 U/L (ref 0–40)
BILIRUB SERPL-MCNC: 0.6 MG/DL (ref 0–1)
BUN SERPL-MCNC: 27 MG/DL (ref 8–28)
CALCIUM SERPL-MCNC: 9.4 MG/DL (ref 8.5–10.5)
CHLORIDE BLD-SCNC: 106 MMOL/L (ref 98–107)
CHOLEST SERPL-MCNC: 191 MG/DL
CO2 SERPL-SCNC: 23 MMOL/L (ref 22–31)
CREAT SERPL-MCNC: 0.93 MG/DL (ref 0.7–1.3)
FASTING STATUS PATIENT QL REPORTED: YES
GFR SERPL CREATININE-BSD FRML MDRD: >60 ML/MIN/1.73M2
GLUCOSE BLD-MCNC: 90 MG/DL (ref 70–125)
HDLC SERPL-MCNC: 65 MG/DL
LDLC SERPL CALC-MCNC: 97 MG/DL
POTASSIUM BLD-SCNC: 4.1 MMOL/L (ref 3.5–5)
PROT SERPL-MCNC: 5.9 G/DL (ref 6–8)
PSA SERPL-MCNC: <0.1 NG/ML (ref 0–6.5)
SODIUM SERPL-SCNC: 141 MMOL/L (ref 136–145)
TRIGL SERPL-MCNC: 143 MG/DL
VIT B12 SERPL-MCNC: 309 PG/ML (ref 213–816)

## 2020-04-15 ENCOUNTER — AMBULATORY - HEALTHEAST (OUTPATIENT)
Dept: INTERNAL MEDICINE | Facility: CLINIC | Age: 75
End: 2020-04-15

## 2020-04-15 DIAGNOSIS — G47.00 INSOMNIA: ICD-10-CM

## 2020-04-15 DIAGNOSIS — F51.04 CHRONIC INSOMNIA: ICD-10-CM

## 2020-04-15 RX ORDER — ALPRAZOLAM 0.5 MG
TABLET ORAL
Qty: 90 TABLET | Refills: 1 | Status: SHIPPED | OUTPATIENT
Start: 2020-04-15 | End: 2022-06-02

## 2020-05-21 ENCOUNTER — AMBULATORY - HEALTHEAST (OUTPATIENT)
Dept: INTERNAL MEDICINE | Facility: CLINIC | Age: 75
End: 2020-05-21

## 2020-05-21 ENCOUNTER — COMMUNICATION - HEALTHEAST (OUTPATIENT)
Dept: INTERNAL MEDICINE | Facility: CLINIC | Age: 75
End: 2020-05-21

## 2020-05-21 DIAGNOSIS — M25.561 CHRONIC PAIN OF RIGHT KNEE: ICD-10-CM

## 2020-05-21 DIAGNOSIS — G89.29 CHRONIC PAIN OF RIGHT KNEE: ICD-10-CM

## 2020-05-21 DIAGNOSIS — M54.16 LUMBAR RADICULOPATHY: ICD-10-CM

## 2020-05-26 ENCOUNTER — COMMUNICATION - HEALTHEAST (OUTPATIENT)
Dept: INTERNAL MEDICINE | Facility: CLINIC | Age: 75
End: 2020-05-26

## 2020-05-27 ENCOUNTER — RECORDS - HEALTHEAST (OUTPATIENT)
Dept: ADMINISTRATIVE | Facility: OTHER | Age: 75
End: 2020-05-27

## 2020-06-04 ENCOUNTER — RECORDS - HEALTHEAST (OUTPATIENT)
Dept: ADMINISTRATIVE | Facility: OTHER | Age: 75
End: 2020-06-04

## 2020-06-04 ENCOUNTER — TELEPHONE (OUTPATIENT)
Dept: OTHER | Facility: CLINIC | Age: 75
End: 2020-06-04

## 2020-06-04 NOTE — TELEPHONE ENCOUNTER
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 Pagan re: potential vascular abnormality, issues with right anterior tibial.   Compartment pressures are normal.   Would like to have imaging either tomorrow afternoon or Tuesday afternoon.    Will discuss with Dr. Pagan.   Usually we have consult prior to imaging.     Addendum: Referral order received via fax from Dr. Valdes noting \"consult with Dr. Pagan, negative compartment testing results, right anterior 15, left anterior 12.     ADRIANA Moore, RN  [facility]     "

## 2020-06-04 NOTE — TELEPHONE ENCOUNTER
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 Magdiel Valdes @ [facility].    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  He has had a 6 to 7-week history of intermittent marked swelling and pain over his right anterior compartment.  This has occurred after exercise but no trauma.    The compartment is visually swollen when the pain occurs.  Last time he applied ice for 20 minutes and the swelling did resolve.    This occurred again and he called Dr. Ramirez who is an orthopedic colleague.  He sent him a photo of the swollen calf and he was very concerned that he had developed acute compartment syndrome.  He recommended compartment testing.  Patient did ice his calf after his episode and did not exercise compartment test at [facility] which was normal being 15 mmHg on the right and 20 mmHg on the left.      He has ongoing issues with this and wanted to discuss the possibility of this being related to popliteal artery entrapment syndrome.  He does have the low back issues but this would be a very unusual presentation of the symptoms.    Usually popliteal artery entrapment syndrome is in a very much younger patient who is very athletic.  However, patient does remain very athletic and thus this is still possible even at his age.  I explained the testing that we did to try to see if there is any signs of extensive compression of the neurovascular structures which could cause swelling and discomfort.  We recommended KATIE with exercise along with dynamic evaluation of the popliteal structures.  We will schedule this to be performed in our office in the near future though testing is somewhat delayed to the COVID-19 ongoing pandemic.       Paulie Pagan MD

## 2020-06-10 ENCOUNTER — HOSPITAL ENCOUNTER (OUTPATIENT)
Dept: ULTRASOUND IMAGING | Facility: CLINIC | Age: 75
End: 2020-06-10
Attending: SURGERY
Payer: COMMERCIAL

## 2020-06-10 DIAGNOSIS — M79.605 BILATERAL LEG PAIN: ICD-10-CM

## 2020-06-10 DIAGNOSIS — I77.89 POPLITEAL ARTERY ENTRAPMENT SYNDROME (H): ICD-10-CM

## 2020-06-10 DIAGNOSIS — M79.604 BILATERAL LEG PAIN: ICD-10-CM

## 2020-06-10 DIAGNOSIS — R09.89 OTHER SPECIFIED SYMPTOMS AND SIGNS INVOLVING THE CIRCULATORY AND RESPIRATORY SYSTEMS: ICD-10-CM

## 2020-06-10 DIAGNOSIS — I77.89 POPLITEAL ARTERY ENTRAPMENT SYNDROME (H): Primary | ICD-10-CM

## 2020-06-10 PROCEDURE — 93924 LWR XTR VASC STDY BILAT: CPT

## 2020-06-10 PROCEDURE — 93925 LOWER EXTREMITY STUDY: CPT

## 2020-06-10 PROCEDURE — 93970 EXTREMITY STUDY: CPT

## 2020-06-15 ENCOUNTER — TELEPHONE (OUTPATIENT)
Dept: OTHER | Facility: CLINIC | Age: 75
End: 2020-06-15

## 2020-06-15 NOTE — RESULT ENCOUNTER NOTE
Called patient with results.  This test along with the arterial testing does not reveal any evidence of extrinsic popliteal artery entrapment syndrome.  Discussed with patient.       Paulie Pagan MD

## 2020-06-15 NOTE — TELEPHONE ENCOUNTER
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  He notices left calf swelling more in the anterior lateral compartment associated with activity such as walking and running.  This is been relatively consistent.  Compartment pressures were normal.    We did ABIs with exercise along with dynamic popliteal artery and vein duplex evaluation with maneuvers which were all completely normal.  This does essentially rule out extrinsic popliteal artery entrapment syndrome and I certainly would not recommend surgical treatment such as division of the plantaris muscle or soleus fascia.    He did have an epidural for his chronic back issues and noticed that the left leg pain did improve but the swelling still comes and goes with activities.    He is forwarding me pictures which definitely show the swelling in the left calf after his activity.    We discussed the possibility of performing fasciotomies of the left anterior lateral compartments to relieve the swelling.  This is treating the symptom the swelling but not necessarily underlying cause but in several patients this has been helpful.  I do not feel that this will cause him harm but may or may not improve his symptoms but it would give the compartments more space.  This would be performed under general anesthetic as an outpatient.  He did resume activities as tolerated.    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  He will think about this further and will be in contact.       Paulie Pagan MD

## 2020-07-06 ENCOUNTER — TELEPHONE (OUTPATIENT)
Dept: NEUROLOGY | Facility: CLINIC | Age: 75
End: 2020-07-06

## 2020-07-06 DIAGNOSIS — G60.8 PERIPHERAL SENSORY-MOTOR AXONAL POLYNEUROPATHY: Primary | ICD-10-CM

## 2020-07-06 NOTE — TELEPHONE ENCOUNTER
,  He is one of your patients and would like to get an EMG again for his peroneal nerve right leg. Could you put orders in for this? If you need to call and talk to him he can be reached at [phone]. Thank you Nicole

## 2020-07-08 ENCOUNTER — RECORDS - HEALTHEAST (OUTPATIENT)
Dept: ADMINISTRATIVE | Facility: OTHER | Age: 75
End: 2020-07-08

## 2020-07-14 ENCOUNTER — RECORDS - HEALTHEAST (OUTPATIENT)
Dept: LAB | Facility: CLINIC | Age: 75
End: 2020-07-14

## 2020-07-14 LAB
ALBUMIN SERPL-MCNC: 3.8 G/DL (ref 3.5–5)
ALP SERPL-CCNC: 71 U/L (ref 45–120)
ALT SERPL W P-5'-P-CCNC: 14 U/L (ref 0–45)
ANION GAP SERPL CALCULATED.3IONS-SCNC: 10 MMOL/L (ref 5–18)
AST SERPL W P-5'-P-CCNC: 29 U/L (ref 0–40)
BILIRUB SERPL-MCNC: 0.4 MG/DL (ref 0–1)
BUN SERPL-MCNC: 20 MG/DL (ref 8–28)
CALCIUM SERPL-MCNC: 9.2 MG/DL (ref 8.5–10.5)
CHLORIDE BLD-SCNC: 109 MMOL/L (ref 98–107)
CO2 SERPL-SCNC: 24 MMOL/L (ref 22–31)
CREAT SERPL-MCNC: 1.01 MG/DL (ref 0.7–1.3)
GFR SERPL CREATININE-BSD FRML MDRD: >60 ML/MIN/1.73M2
GLUCOSE BLD-MCNC: 95 MG/DL (ref 70–125)
POTASSIUM BLD-SCNC: 4.1 MMOL/L (ref 3.5–5)
PROT SERPL-MCNC: 5.9 G/DL (ref 6–8)
SODIUM SERPL-SCNC: 143 MMOL/L (ref 136–145)
VIT B12 SERPL-MCNC: 366 PG/ML (ref 213–816)

## 2020-11-02 ENCOUNTER — AMBULATORY - HEALTHEAST (OUTPATIENT)
Dept: INTERNAL MEDICINE | Facility: CLINIC | Age: 75
End: 2020-11-02

## 2020-11-02 DIAGNOSIS — E88.810 METABOLIC SYNDROME: ICD-10-CM

## 2020-11-08 ENCOUNTER — HEALTH MAINTENANCE LETTER (OUTPATIENT)
Age: 75
End: 2020-11-08

## 2020-12-22 ENCOUNTER — RECORDS - HEALTHEAST (OUTPATIENT)
Dept: ADMINISTRATIVE | Facility: OTHER | Age: 75
End: 2020-12-22

## 2020-12-22 ENCOUNTER — RECORDS - HEALTHEAST (OUTPATIENT)
Dept: RADIOLOGY | Facility: CLINIC | Age: 75
End: 2020-12-22

## 2020-12-22 ENCOUNTER — AMBULATORY - HEALTHEAST (OUTPATIENT)
Dept: NEUROSURGERY | Facility: CLINIC | Age: 75
End: 2020-12-22

## 2020-12-29 ENCOUNTER — RECORDS - HEALTHEAST (OUTPATIENT)
Dept: LAB | Facility: CLINIC | Age: 75
End: 2020-12-29

## 2020-12-29 ENCOUNTER — OFFICE VISIT - HEALTHEAST (OUTPATIENT)
Dept: NEUROSURGERY | Facility: CLINIC | Age: 75
End: 2020-12-29

## 2020-12-29 DIAGNOSIS — M54.16 LUMBAR RADICULOPATHY: ICD-10-CM

## 2020-12-29 DIAGNOSIS — M43.16 SPONDYLOLISTHESIS OF LUMBAR REGION: ICD-10-CM

## 2020-12-29 LAB
ALBUMIN SERPL-MCNC: 4.2 G/DL (ref 3.5–5)
ALP SERPL-CCNC: 78 U/L (ref 45–120)
ALT SERPL W P-5'-P-CCNC: 17 U/L (ref 0–45)
ANION GAP SERPL CALCULATED.3IONS-SCNC: 10 MMOL/L (ref 5–18)
AST SERPL W P-5'-P-CCNC: 33 U/L (ref 0–40)
BILIRUB SERPL-MCNC: 0.5 MG/DL (ref 0–1)
BUN SERPL-MCNC: 29 MG/DL (ref 8–28)
CALCIUM SERPL-MCNC: 9.1 MG/DL (ref 8.5–10.5)
CHLORIDE BLD-SCNC: 104 MMOL/L (ref 98–107)
CO2 SERPL-SCNC: 24 MMOL/L (ref 22–31)
CREAT SERPL-MCNC: 1.11 MG/DL (ref 0.7–1.3)
ERYTHROCYTE [DISTWIDTH] IN BLOOD BY AUTOMATED COUNT: 13 % (ref 11–14.5)
GFR SERPL CREATININE-BSD FRML MDRD: >60 ML/MIN/1.73M2
GLUCOSE BLD-MCNC: 96 MG/DL (ref 70–125)
HCT VFR BLD AUTO: 40.8 % (ref 40–54)
HGB BLD-MCNC: 13.8 G/DL (ref 14–18)
MCH RBC QN AUTO: 30.7 PG (ref 27–34)
MCHC RBC AUTO-ENTMCNC: 33.8 G/DL (ref 32–36)
MCV RBC AUTO: 91 FL (ref 80–100)
PLATELET # BLD AUTO: 249 THOU/UL (ref 140–440)
PMV BLD AUTO: 10.6 FL (ref 8.5–12.5)
POTASSIUM BLD-SCNC: 4.2 MMOL/L (ref 3.5–5)
PROT SERPL-MCNC: 6.5 G/DL (ref 6–8)
RBC # BLD AUTO: 4.5 MILL/UL (ref 4.4–6.2)
SODIUM SERPL-SCNC: 138 MMOL/L (ref 136–145)
VIT B12 SERPL-MCNC: 480 PG/ML (ref 213–816)
WBC: 8.2 THOU/UL (ref 4–11)

## 2020-12-29 ASSESSMENT — MIFFLIN-ST. JEOR: SCORE: 1479.85

## 2020-12-30 ENCOUNTER — RECORDS - HEALTHEAST (OUTPATIENT)
Dept: LAB | Facility: CLINIC | Age: 75
End: 2020-12-30

## 2020-12-30 LAB — MAGNESIUM SERPL-MCNC: 1.9 MG/DL (ref 1.8–2.6)

## 2021-03-18 ENCOUNTER — OFFICE VISIT - HEALTHEAST (OUTPATIENT)
Dept: INTERNAL MEDICINE | Facility: CLINIC | Age: 76
End: 2021-03-18

## 2021-03-18 DIAGNOSIS — N52.8 OTHER MALE ERECTILE DYSFUNCTION: ICD-10-CM

## 2021-03-18 DIAGNOSIS — F41.9 ANXIETY: ICD-10-CM

## 2021-03-18 DIAGNOSIS — Z98.1 S/P CERVICAL SPINAL FUSION: ICD-10-CM

## 2021-03-18 DIAGNOSIS — F51.01 PRIMARY INSOMNIA: ICD-10-CM

## 2021-03-18 DIAGNOSIS — D64.9 NORMOCHROMIC ANEMIA: ICD-10-CM

## 2021-03-18 DIAGNOSIS — N20.0 CALCULUS OF KIDNEY: ICD-10-CM

## 2021-03-18 DIAGNOSIS — I10 ESSENTIAL HYPERTENSION: ICD-10-CM

## 2021-03-18 DIAGNOSIS — I73.00 RAYNAUD'S PHENOMENON WITHOUT GANGRENE: ICD-10-CM

## 2021-03-18 DIAGNOSIS — E78.00 HYPERCHOLESTEROLEMIA: ICD-10-CM

## 2021-03-18 DIAGNOSIS — M54.12 CERVICAL RADICULOPATHY: ICD-10-CM

## 2021-03-18 DIAGNOSIS — M54.2 NECK PAIN: ICD-10-CM

## 2021-03-18 DIAGNOSIS — Z98.1 S/P LUMBAR FUSION: ICD-10-CM

## 2021-03-18 DIAGNOSIS — I71.21 THORACIC ASCENDING AORTIC ANEURYSM (H): ICD-10-CM

## 2021-03-18 RX ORDER — TESTOSTERONE 1.62 MG/G
2 GEL TRANSDERMAL
Status: SHIPPED | COMMUNITY
Start: 2021-03-18 | End: 2022-06-02 | Stop reason: ALTCHOICE

## 2021-03-18 RX ORDER — LATANOPROST 50 UG/ML
1 SOLUTION/ DROPS OPHTHALMIC
Status: SHIPPED | COMMUNITY
Start: 2021-03-18 | End: 2023-10-04

## 2021-03-18 RX ORDER — LOSARTAN POTASSIUM 50 MG/1
50 TABLET ORAL DAILY
Qty: 90 TABLET | Refills: 3 | Status: SHIPPED | OUTPATIENT
Start: 2021-03-18 | End: 2022-02-18

## 2021-03-18 RX ORDER — TIMOLOL MALEATE 5 MG/ML
SOLUTION/ DROPS OPHTHALMIC
Status: SHIPPED | COMMUNITY
Start: 2021-03-02 | End: 2023-10-04

## 2021-03-30 ENCOUNTER — COMMUNICATION - HEALTHEAST (OUTPATIENT)
Dept: INTERNAL MEDICINE | Facility: CLINIC | Age: 76
End: 2021-03-30

## 2021-03-30 ENCOUNTER — RECORDS - HEALTHEAST (OUTPATIENT)
Dept: ADMINISTRATIVE | Facility: OTHER | Age: 76
End: 2021-03-30

## 2021-03-30 ENCOUNTER — HOSPITAL ENCOUNTER (OUTPATIENT)
Dept: CT IMAGING | Facility: CLINIC | Age: 76
Discharge: HOME | End: 2021-03-30
Attending: INTERNAL MEDICINE

## 2021-03-30 DIAGNOSIS — I71.21 THORACIC ASCENDING AORTIC ANEURYSM (H): ICD-10-CM

## 2021-03-30 LAB
CREAT BLD-MCNC: 1.2 MG/DL (ref 0.7–1.3)
GFR SERPL CREATININE-BSD FRML MDRD: 59 ML/MIN/1.73M2

## 2021-04-12 ENCOUNTER — RECORDS - HEALTHEAST (OUTPATIENT)
Dept: ADMINISTRATIVE | Facility: OTHER | Age: 76
End: 2021-04-12

## 2021-05-06 ENCOUNTER — RECORDS - HEALTHEAST (OUTPATIENT)
Dept: ADMINISTRATIVE | Facility: OTHER | Age: 76
End: 2021-05-06

## 2021-05-25 ENCOUNTER — RECORDS - HEALTHEAST (OUTPATIENT)
Dept: ADMINISTRATIVE | Facility: CLINIC | Age: 76
End: 2021-05-25

## 2021-05-26 ENCOUNTER — RECORDS - HEALTHEAST (OUTPATIENT)
Dept: ADMINISTRATIVE | Facility: CLINIC | Age: 76
End: 2021-05-26

## 2021-05-28 ENCOUNTER — RECORDS - HEALTHEAST (OUTPATIENT)
Dept: ADMINISTRATIVE | Facility: CLINIC | Age: 76
End: 2021-05-28

## 2021-05-28 NOTE — TELEPHONE ENCOUNTER
Patient notified. He has an appointment on 05/08/19 with PCP and he will recheck the urine then and discuss the CT at that time.    Jazzmine Rossi, CMA

## 2021-05-28 NOTE — TELEPHONE ENCOUNTER
----- Message from Derek Mccord MD sent at 5/1/2019  4:57 PM CDT -----  Call patient    Large amount of blood in the urine.  Microscopic  History of stones  CT 2017 showed stones  Will recheck UA  Consider recheck CT

## 2021-05-28 NOTE — PROGRESS NOTES
Preoperative Exam    Scheduled Procedure: R THR  Surgery Date:  05/09/19  Surgery Location: [place]    Surgeon:  Flip Frey    Assessment/Plan:     1. Preoperative examination  Healthy physician cleared to undergo general anesthesia/major surgery.  - Electrocardiogram Perform and Read    2. Osteoarthritis of right hip, unspecified osteoarthritis type       3. Essential hypertension  Excellent control    4. Hematuria, microscopic  Probably due to nephrolithiasis.  Recheck urinalysis negative.  Has upcoming urology visit later this summer   if has episode of gross hematuria clearly would need CT    - Urinalysis-UC if Indicated    5. Hypercholesteremia  On Rx    6. Nephrolithiasis  Multiple.  Asymptomatic.    7. Insomnia, unspecified type  On as needed Ambien          Recommendations  1.Patient approved for surgery with general or local anesthesia.  2.  Prophylactic antibiotic  3.  DVT prophylaxis per Ortho  4.  Recommend PPI for 2 weeks for stress gastritis prophylaxis in a  patient with history of peptic ulcer disease  5.  Hold BP meds a.m. of surgery  6.  Note Demerol morphine allergies.  Does tolerate oral Percocet        Dr. Frey thank you for this consultation.  If you have any questions or concerns please do not hesitate to contact me    Derek Mccord MD  Internal medicine  [facility]  [phone]  [email or web address]            Surgical Procedure Risk: Low (reported cardiac risk generally < 1%)  Have you had prior anesthesia?: Yes  Have you or any family members had a previous anesthesia reaction:  No  Do you or any family members have a history of a clotting or bleeding disorder?: Cardiac Risk Assessment: no increased risk for major cardiac complications    Functional Status: Independent  Patient plans to recover at home with family.     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  Patient is scheduled to have  elective right total hip replacement due to pain.  Loss of mobility.  He has had multiple surgeries in the past Without issue  Is no history of postop infection or DVT    He feels well        All other systems reviewed and are negative, other than those listed in the HPI.    History of hypertension.  Recent hydrochlorothiazide added  Hypertension-There are no cardiovascular, respiratory, neurologic complaints.No claudication.There is no orthostasis.Patient is compliant with medications.  Medications reviewed.   No side effects from medication.    Hyperlipoproteinemia-patient is tolerating medication.  There are no myalgia, arthralgia, weakness.  No Bowel issues.  Liver profile has been normal.  Patient has met cholesterol goals.        Pertinent History  Do you have difficulty breathing or chest pain after walking up a flight of stairs: No  History of obstructive sleep apnea: No  Steroid use in the last 6 months: Yes: Injection  Frequent Aspirin/NSAID use: Yes:    Prior Blood Transfusion: No  Prior Blood Transfusion Reaction: No  If for some reason prior to, during or after the procedure, if it is medically indicated, would you be willing to have a blood transfusion?:  There is no transfusion refusal.    Current Outpatient Medications   Medication Sig Dispense Refill     aspirin 81 MG EC tablet Take 81 mg by mouth daily.       atorvastatin (LIPITOR) 40 MG tablet Take 1 tablet (40 mg total) by mouth daily. 90 tablet 3     hydroCHLOROthiazide (HYDRODIURIL) 12.5 MG tablet Take 1 tablet (12.5 mg total) by mouth daily. 30 tablet 2     ibuprofen (ADVIL,MOTRIN) 200 MG tablet Take 600 mg by mouth every 6 (six) hours as needed.       losartan-hydrochlorothiazide (HYZAAR) 100-12.5 mg per tablet Take 1 tablet by mouth daily. 90 tablet 3     ALPRAZolam (XANAX) 0.5 MG tablet TAKE 1 TABLET BY MOUTH AS NEEDED FOR SLEEP OR ANXIETY 90 tablet 1     zolpidem (AMBIEN) 10 mg tablet TAKE 1 TABLET EVERY NIGHT AT BEDTIME AS NEEDED FOR  SLEEP 90 tablet 1     No current facility-administered medications for this visit.         Allergies   Allergen Reactions     Demerol [Meperidine] Nausea Only     Morphine (Pf) Nausea Only     Nitrous Oxide Nausea Only       Patient Active Problem List   Diagnosis     Calculus of ureter     Calculus of kidney     Essential hypertension     Hypercholesterolemia     Thoracic ascending aortic aneurysm (H)       Past Medical History:   Diagnosis Date     Agatston coronary artery calcium score less than 100 09/2017    score 90  2017     Cancer (H)     cancer, 15 yrs ago      Gastric ulcer      High cholesterol      Hyperlipidemia      Hypertension      Kidney stone      PONV (postoperative nausea and vomiting)     nausea with Nitrous     Thoracic ascending aortic aneurysm (H) 09/2017    4.1 cm on ct       Past Surgical History:   Procedure Laterality Date     CERVICAL SPINE SURGERY       CYSTOSCOPY W/ URETERAL STENT PLACEMENT Bilateral 4/25/2016    Procedure: CYSTOSCOPY, URETEROSCOPIC LASER LITHOTRIPSY, STENT INSERTION;  Surgeon: Lonnie Rosenbaum MD;  Location: [facility];  Service:      ND ANESTH,REPAIR UPPER ABD HERNIA NOS       SHOULDER SURGERY       URETEROSCOPY         Social History     Socioeconomic History     Marital status:      Spouse name: Not on file     Number of children: Not on file     Years of education: Not on file     Highest education level: Not on file   Occupational History     Occupation: MD     Employer: [employer]   Social Needs     Financial resource strain: Not on file     Food insecurity:     Worry: Not on file     Inability: Not on file     Transportation needs:     Medical: Not on file     Non-medical: Not on file   Tobacco Use     Smoking status: Never Smoker     Smokeless tobacco: Never Used     Tobacco comment: 2 cigars per month   Substance and Sexual Activity     Alcohol use: Yes     Comment: occas     Drug use: No     Sexual activity: Not on file  "  Lifestyle     Physical activity:     Days per week: Not on file     Minutes per session: Not on file     Stress: Not on file   Relationships     Social connections:     Talks on phone: Not on file     Gets together: Not on file     Attends Latter day service: Not on file     Active member of club or organization: Not on file     Attends meetings of clubs or organizations: Not on file     Relationship status: Not on file     Intimate partner violence:     Fear of current or ex partner: Not on file     Emotionally abused: Not on file     Physically abused: Not on file     Forced sexual activity: Not on file   Other Topics Concern     Not on file   Social History Narrative     Not on file       Patient Care Team:  Derek Mccord MD as PCP - General (Internal Medicine)          Objective:     Vitals:    05/08/19 1202   BP: 120/78   Pulse: 73   SpO2: 99%   Weight: 165 lb (74.8 kg)   Height: 5' 8.75\" (1.746 m)     Pleasant healthy-appearing man    Physical Exam:  Skin: Normal. No rash or lesion  Lymph Nodes: None palpable-including neck, axilla, inguinal, epitrochlear.  Head:  Normocephalic.    Eyes: Midline.  Equal size., full ROM.  External exams normal.  No icterus  Ears:  Normal pinnae, canals, and TM's.    Nose:  Patent, without deformity.    Throat:  Moist mucous membranes without lesions, erythema, or exudate.    Neck: No palpable masses, lymphadenopathy or tenderness.No thyromegaly or goiter.  No thyroid nodule.  Carotid Arteries:  No Bruit.  Carotid upstroke normal  Chest Wall: No deformity or pain elicited on compression.  Respiratory:  Normal respiratory effort.  Lungs are clear with good breath sounds.  No dullness.  No wheezing.  Heart: Regular rhythm.  Normal sounding S1, S2 without S3, S4, murmurs, rubs, or gallops.    Abdomen:  The abdomen was flat, soft and nontender without guarding rebound or masses.  There are normal bowel sounds.  There is no hepatosplenomegaly.  There is no palpable enlargement of " the aorta.  Prostate/rectal not done  Extremities: Negative edema.    4+ pulses  Neurologic-   Gait normal  Reflexes present equal ankle knee    There are no Patient Instructions on file for this visit.       Labs:  Recent Results (from the past 240 hour(s))   Basic Metabolic Panel    Collection Time: 04/30/19  8:14 AM   Result Value Ref Range    Sodium 141 136 - 145 mmol/L    Potassium 4.1 3.5 - 5.0 mmol/L    Chloride 105 98 - 107 mmol/L    CO2 28 22 - 31 mmol/L    Anion Gap, Calculation 8 5 - 18 mmol/L    Glucose 106 70 - 125 mg/dL    Calcium 10.0 8.5 - 10.5 mg/dL    BUN 28 8 - 28 mg/dL    Creatinine 1.00 0.70 - 1.30 mg/dL    GFR MDRD Af Amer >60 >60 mL/min/1.73m2    GFR MDRD Non Af Amer >60 >60 mL/min/1.73m2   Erythrocyte Sedimentation Rate    Collection Time: 04/30/19  8:14 AM   Result Value Ref Range    Sed Rate 3 0 - 15 mm/hr   Hepatic Profile    Collection Time: 04/30/19  8:14 AM   Result Value Ref Range    Bilirubin, Total 0.5 0.0 - 1.0 mg/dL    Bilirubin, Direct 0.2 <=0.5 mg/dL    Protein, Total 6.7 6.0 - 8.0 g/dL    Albumin 4.1 3.5 - 5.0 g/dL    Alkaline Phosphatase 63 45 - 120 U/L    AST 23 0 - 40 U/L    ALT 14 0 - 45 U/L   Lipid Cascade    Collection Time: 04/30/19  8:14 AM   Result Value Ref Range    Cholesterol 210 (H) <=199 mg/dL    Triglycerides 71 <=149 mg/dL    HDL Cholesterol 69 >=40 mg/dL    LDL Calculated 127 <=129 mg/dL    Patient Fasting > 8hrs? Yes    PSA (Prostatic-Specific Antigen), Diagnostic    Collection Time: 04/30/19  8:14 AM   Result Value Ref Range    PSA <0.1 0.0 - 6.5 ng/mL   Thyroid Cascade    Collection Time: 04/30/19  8:14 AM   Result Value Ref Range    TSH 1.83 0.30 - 5.00 uIU/mL   Urinalysis-UC if Indicated    Collection Time: 04/30/19  8:14 AM   Result Value Ref Range    Color, UA Cira (!) Colorless, Yellow, Straw, Light Yellow    Clarity, UA Hazy (!) Clear    Glucose, UA Negative Negative    Bilirubin, UA Negative Negative    Ketones, UA Negative Negative    Specific  Gravity, UA 1.015 1.001 - 1.030    Blood, UA Large (!) Negative    pH, UA 6.0 4.5 - 8.0    Protein, UA 30 mg/dL (!) Negative mg/dL    Urobilinogen, UA <2.0 E.U./dL <2.0 E.U./dL, 2.0 E.U./dL    Nitrite, UA Negative Negative    Leukocytes, UA Negative Negative    Bacteria, UA None Seen None Seen hpf    RBC, UA >100 (!) None Seen, 0-2 hpf    WBC, UA 0-5 None Seen, 0-5 hpf    Squam Epithel, UA 0-5 None Seen, 0-5 lpf    Mucus, UA Moderate (!) None Seen lpf   HM1 (CBC with Diff)    Collection Time: 04/30/19  8:14 AM   Result Value Ref Range    WBC 7.4 4.0 - 11.0 thou/uL    RBC 4.58 4.40 - 6.20 mill/uL    Hemoglobin 14.4 14.0 - 18.0 g/dL    Hematocrit 43.1 40.0 - 54.0 %    MCV 94 80 - 100 fL    MCH 31.4 27.0 - 34.0 pg    MCHC 33.4 32.0 - 36.0 g/dL    RDW 13.0 11.0 - 14.5 %    Platelets 306 140 - 440 thou/uL    MPV 9.6 8.5 - 12.5 fL    Neutrophils % 53 50 - 70 %    Lymphocytes % 35 20 - 40 %    Monocytes % 10 2 - 10 %    Eosinophils % 2 0 - 6 %    Basophils % 0 0 - 2 %    Neutrophils Absolute 3.9 2.0 - 7.7 thou/uL    Lymphocytes Absolute 2.6 0.8 - 4.4 thou/uL    Monocytes Absolute 0.7 0.0 - 0.9 thou/uL    Eosinophils Absolute 0.1 0.0 - 0.4 thou/uL    Basophils Absolute 0.0 0.0 - 0.2 thou/uL   Electrocardiogram Perform and Read    Collection Time: 05/08/19 12:13 PM   Result Value Ref Range    SYSTOLIC BLOOD PRESSURE  mmHg    DIASTOLIC BLOOD PRESSURE  mmHg    VENTRICULAR RATE 71 BPM    ATRIAL RATE 71 BPM    P-R INTERVAL 170 ms    QRS DURATION 94 ms    Q-T INTERVAL 390 ms    QTC CALCULATION (BEZET) 423 ms    P Axis 74 degrees    R AXIS -17 degrees    T AXIS 57 degrees    MUSE DIAGNOSIS       Normal sinus rhythm  Normal ECG  When compared with ECG of 04-APR-2018 14:24,  No significant change was found     Urinalysis-UC if Indicated    Collection Time: 05/08/19 12:19 PM   Result Value Ref Range    Color, UA Yellow Colorless, Yellow, Straw, Light Yellow    Clarity, UA Clear Clear    Glucose, UA Negative Negative    Bilirubin, UA  Negative Negative    Ketones, UA Negative Negative    Specific Gravity, UA 1.015 1.005 - 1.030    Blood, UA Negative Negative    pH, UA 7.0 5.0 - 8.0    Protein, UA Negative Negative mg/dL    Urobilinogen, UA 0.2 E.U./dL 0.2 E.U./dL, 1.0 E.U./dL    Nitrite, UA Negative Negative    Leukocytes, UA Negative Negative              ECG today normal sinus rhythm normal ECG      Immunization History   Administered Date(s) Administered     Influenza, inj, historic,unspecified 09/26/2017           Electronically signed by Derek Mccord MD 05/08/19 12:07 PM

## 2021-05-28 NOTE — PROGRESS NOTES
Call patient    Large amount of blood in the urine.  Microscopic  History of stones  CT 2017 showed stones  Will recheck UA  Consider recheck CT

## 2021-05-29 ENCOUNTER — RECORDS - HEALTHEAST (OUTPATIENT)
Dept: ADMINISTRATIVE | Facility: CLINIC | Age: 76
End: 2021-05-29

## 2021-05-30 ENCOUNTER — RECORDS - HEALTHEAST (OUTPATIENT)
Dept: ADMINISTRATIVE | Facility: CLINIC | Age: 76
End: 2021-05-30

## 2021-05-30 NOTE — PROGRESS NOTES
[facility] Discharge Summary  Patient Name: Clayton Vaca MD  Date: 8/27/2019  Referral Diagnosis: lumbar radiculopathy  Referring provider: Zachery Linares MD  Visit Diagnosis:   1. Right lumbar radiculopathy     2. Right hip pain         Goals:  Pt. will be independent with home exercise program in : 6 weeks  Pt. will be able to walk : 60 minutes;on uneven/inclined surfaces;with less pain;with less difficulty;for exercise/recreation;in 6 weeks  Patient will stand : 30 minutes;with no pain;with less difficultty;for work;in 6 weeks    Pt will: be able to play >10 holes of golf with no increase of radicular symptoms by 6 weeks.  Pt will: be able to swing a golf club with no increase of RLE symptoms by 6 weeks.       Patient was seen for initial evaluation on 7/23/19 for physical therapy of lumbar radiculopathy, with no follow up appointments scheduled. Patient did not return therefore goals and progress could not be updated and measured.   The patient discontinued therapy, did not return.  No further therapy is required at this time.    Therapy will be discontinued at this time.  The patient will need a new referral to resume physical therapy treatment. Please see below for patient's current status.    Thank you for your referral.  Taylor Crowley, PT, DPT  8/27/2019  11:00 AM          [facility] Certification Request    July 23, 2019      Patient: Clayton Vaca MD  MR Number: [id number]  YOB: 1945  Date of Visit: 7/23/2019      Dear Dr. Linares,    Thank you for this referral.   We are seeing Clayton Vaca for Physical Therapy of right lumbar radiculopathy.    Medicare and/or Medicaid requires physician review and approval of the treatment plan. Please review the plan of care and verify that you agree with the therapy plan of care by co-signing this note.      Plan of Care  Authorization / Certification Start Date: 07/23/19  Authorization / Certification End Date: 10/23/19  Authorization  / Certification Number of Visits: 12  Communication with: Referral Source  Patient Related Instruction: Nature of Condition;Treatment plan and rationale;Self Care instruction;Body mechanics;Basis of treatment;Next steps;Posture;Expected outcome  Times per Week: 1-2  Number of Weeks: 6-8  Number of Visits: 12  Discharge Planning: independent HEP and/or plateau of progress  Therapeutic Exercise: ROM;Stretching;Strengthening  Neuromuscular Reeducation: kinesio tape;posture;balance/proprioception;TNE;core  Manual Therapy: soft tissue mobilization;myofascial release;joint mobilization;muscle energy  Modalities: TENS  Gait Training: as indicated      Goals:  Pt. will be independent with home exercise program in : 6 weeks  Pt. will be able to walk : 60 minutes;on uneven/inclined surfaces;with less pain;with less difficulty;for exercise/recreation;in 6 weeks  Patient will stand : 30 minutes;with no pain;with less difficultty;for work;in 6 weeks    Pt will: be able to play >10 holes of golf with no increase of radicular symptoms by 6 weeks.  Pt will: be able to swing a golf club with no increase of RLE symptoms by 6 weeks.         If you have any questions or concerns, please don't hesitate to call.    Sincerely,      Taylor Crowley, PT  [phone]      Physician recommendation:     ___ Follow therapist's recommendation        ___ Modify therapy      *Physician co-signature indicates they certify the need for these services furnished within this plan and while under their care.          [facility]   [facility] Initial Evaluation    Patient Name: Clayton Vaca MD  Date of evaluation: 7/23/2019  Referral Diagnosis: Right lumbar radiculopathy  Referring provider: Zachery Linares MD  Visit Diagnosis:     ICD-10-CM    1. Right lumbar radiculopathy M54.16    2. Right hip pain M25.551        Assessment:        Patient is a 74 y.o. 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 Patient demonstrates impairments including decreased R>L hip IR>ER, TTP at piriformis, decreased R>L glute and adductor strength, with reported decreased sensation of L5 distribution, leading to impaired functional mobility. Patient's functional limitations include walking for longer periods of time, playing >10 holes of golf, swinging the golf club, carrying/lifting over 10lbs, and rolling over in bed. Today patient responded well to patient education and therapeutic exercise.    Patient educated, demonstrated understanding and is in agreement with nature of impairment, plan of care, patient role and HEP. Patient compliant with PT and prognosis is good. Patient would benefit from skilled PT to progress and improve above impairments.    The POC is dynamic and will be modified on an ongoing basis.  Patient will return to clinic if symptoms persist.  Barriers to achieving goals as noted in the assessment section may affect outcome.  Prognosis to achieve goals is  fair   Pt. is appropriate for skilled PT intervention as outlined in the Plan of Care (POC).  Pt. is a good candidate for skilled PT services to improve pain levels and function.    Goals:  Pt. will be independent with home exercise program in : 6 weeks  Pt. will be able to walk : 60 minutes;on uneven/inclined surfaces;with less pain;with less difficulty;for exercise/recreation;in 6 weeks  Patient will stand : 30 minutes;with no pain;with less difficultty;for work;in 6 weeks    Pt will: be able to play >10 holes of golf with no increase of radicular symptoms by 6 weeks.  Pt will: be able to swing a golf club with no increase of RLE symptoms by 6 weeks.       Patient's expectations/goals are realistic.    Barriers to Learning or Achieving Goals:  Chronicity of the problem.       Plan / Patient Instructions:        Plan of Care:    Authorization / Certification Start Date: 07/23/19  Authorization / Certification End Date: 10/23/19  Authorization / Certification Number of Visits: 12  Communication with: Referral Source  Patient Related Instruction: Nature of Condition;Treatment plan and rationale;Self Care instruction;Body mechanics;Basis of treatment;Next steps;Posture;Expected outcome  Times per Week: 1-2  Number of Weeks: 6-8  Number of Visits: 12  Discharge Planning: independent HEP and/or plateau of progress  Therapeutic Exercise: ROM;Stretching;Strengthening  Neuromuscular Reeducation: kinesio tape;posture;balance/proprioception;TNE;core  Manual Therapy: soft tissue mobilization;myofascial release;joint mobilization;muscle energy  Modalities: TENS  Gait Training: as indicated      POC and pathology of condition were reviewed with patient.  Pt. is in agreement with the Plan of Care  A Home Exercise Program (HEP) was initiated today.  Pt. was instructed in exercises by PT and patient was given a handout with detailed instructions.    Plan for next visit: review HEP, abdominal isometrics, core strengthening, lumbar rotation exercises, glute strengthening exercises, manual hip mobilizations      Subjective:         History of Present Illness:    Clayotn is a 74 y.o. male who presents to therapy today with complaints of right sided lumbar radiculopathy. Date of onset is January 2019 and onset was gradual. Symptoms are intermittent. Patient reports neurologist gave him epidural injections that didn't do anything, feeling weakness of his toe on the right. He did have a total hip about 10 weeks ago, anterior approach, some pain in adductors. Getting an injection on Friday for L4-5. Patient has been less active recently and he was noticing some decreased sensation on R L5 sensation. He denies history of similar symptoms. He describes their previous level of function as not limited. Walking 9 holes of golf it tends to hurt in the front of his shin,  it got worse over the last 2 years. If patient takes Motrin, it can help about 6-8 hours, he has been trying to work on IR and ER of his R hip. Patient is getting radiculopathy with 9 holes, unsure if it is leg pain or back pain.     Pain Rating: 3  Pain rating at best: 2  Pain rating at worst: 5  Pain description: achy pain with tingling weird sensations in L5 distribution and his leg gets real heavy    Functional limitations are described as occurring with:   lifting  performing routine daily activities  sleeping  twisting or turning trunk  walking for longer periods of time  pushing and rotating off of that leg    Patient reports benefit from:  rest  , anti-inflammatory         Objective:      Note: Items left blank indicates the item was not performed or not indicated at the time of the evaluation.    Patient Outcome Measures :    Modified Oswestry Low Back Pain Disablity Questionnaire  in %: 28     Scores range from 0-100%, where a score of 0% represents minimal pain and maximal function. The minimal clinically important difference is a score reduction of 12%.    Examination  1. Right lumbar radiculopathy     2. Right hip pain       Involved side: Right  Posture Observation:      General sitting posture is  normal.  General standing posture is normal.    Lumbar ROM:    Date: 7/23/2019     *Indicate scale AROM AROM AROM   Lumbar Flexion Limited with back P     Lumbar Extension limited      Right Left Right Left Right Left   Lumbar Sidebending P        Lumbar Rotation         Thoracic Flexion      Thoracic Extension      Thoracic Sidebending         Thoracic Rotation           Lower Extremity Strength:   WFL unless noted  Date: 7/23/2019     LE strength/5 Right Left Right Left Right Left   Hip Flexion (L1-3)         Hip Extension (L5-S1)         Hip Abduction (L4-5)         Hip Adduction (L2-3)         Hip External Rotation         Hip Internal Rotation         Knee Extension (L3-4)         Knee Flexion          Ankle Dorsiflexion (L4-5) 4        Great Toe Extension (L5) Reported weakness        Ankle Plantar flexion (S1)         Abdominals        Sensation    NT Today       Reflex Testing  Lumbar Dermatomes Right Left UE Reflexes Right Left   Iliac Crest and Groin (L1)   Biceps (C5-6)     Anterior Medial Thigh (L2)   Brachioradialis (C5-6)     Anterior Thigh, Medial Epicondyle Femur (L3)   Triceps (C7-8)     Lateral Thigh, Anterior Knee, Medial Leg/Malleolus (L4)   Michael s test     Lateral Leg, Dorsal Foot (L5) Reported decrease of sensation  LE Reflexes     Lateral Foot (S1)   Patellar (L3-4)     Posterior Leg (S2)   Achilles (S1-2)     Other:   Babinski Response         Lumbar Special Tests:     Lumbar Special Tests Right Left SI Tests Right  Left   Quadrant test   SI Compression     Straight leg raise - - SI Distraction     Crossover response   POSH Test     Slump - - Sacral Thrust     Sit-up test  FADIR - -   Trunk extensor endurance test  Resisted Abduction     Prone instability test  Other:     Pubic shotgun  Other:         Passive Mobility - Joint Integrity:  Hypomobile    Palpation: TTP at R ITB, piriformis, glute med, PSIS  Flexibility: decreased of hamstrings, HF, QL, glutes, hip IR      Treatment Today       Patient Education: Patient educated on plan of care, prognosis, PT/patient role and HEP. Patient educated on impairments related to condition and reproduction of symptoms. Patient instructed to focus on the small goals and this may be a long process to recovery, and that exercises at home are just as important as coming to therapy. Patient was educated on importance of exercise consistency and activity modification in order to see progress and change. Patient demonstrated and verbalized understanding.     Exercises:  Exercise #1: SKTC, piriformis/figure 4 stretch, lumbar rotation stretch - hold 30 sec  Comment #1: seated hamstring stretch - hold 30 sec  Exercise #2: supine bridge with hip adduction - 10  reps  Comment #2: standing or supine hip flexor stretch - hold 30 sec           TREATMENT MINUTES COMMENTS   Evaluation 25    Self-care/ Home management     Manual therapy     Neuromuscular Re-education     Therapeutic Activity     Therapeutic Exercises 25 See flowsheet  Patient education on plan of care   Gait training     Modality__________________                Total 50    Blank areas are intentional and mean the treatment did not include these items.     PT Evaluation Code: (Please list factors)  Patient History/Comorbidities: hypertension, L3-4 moderate stenosis, L5 nerve root impingement  Examination: decreased mobility increased pain  Clinical Presentation: stable  Clinical Decision Making: low    Patient History/  Comorbidities Examination  (body structures and functions, activity limitations, and/or participation restrictions) Clinical Presentation Clinical Decision Making (Complexity)   No documented Comorbidities or personal factors 1-2 Elements Stable and/or uncomplicated Low   1-2 documented comorbidities or personal factor 3 Elements Evolving clinical presentation with changing characteristics Moderate   3-4 documented comorbidities or personal factors 4 or more Unstable and unpredictable High                Taylor Crowley, PT  7/23/2019  8:13 AM

## 2021-05-31 ENCOUNTER — RECORDS - HEALTHEAST (OUTPATIENT)
Dept: ADMINISTRATIVE | Facility: CLINIC | Age: 76
End: 2021-05-31

## 2021-05-31 VITALS — BODY MASS INDEX: 24.44 KG/M2 | HEIGHT: 69 IN | WEIGHT: 165 LBS

## 2021-05-31 VITALS — WEIGHT: 166 LBS | HEIGHT: 69 IN | BODY MASS INDEX: 24.59 KG/M2

## 2021-05-31 VITALS — BODY MASS INDEX: 24.73 KG/M2 | HEIGHT: 69 IN | WEIGHT: 167 LBS

## 2021-06-01 ENCOUNTER — RECORDS - HEALTHEAST (OUTPATIENT)
Dept: ADMINISTRATIVE | Facility: CLINIC | Age: 76
End: 2021-06-01

## 2021-06-01 VITALS — BODY MASS INDEX: 24.54 KG/M2 | HEIGHT: 69 IN

## 2021-06-02 ENCOUNTER — RECORDS - HEALTHEAST (OUTPATIENT)
Dept: ADMINISTRATIVE | Facility: CLINIC | Age: 76
End: 2021-06-02

## 2021-06-02 VITALS — HEIGHT: 69 IN | WEIGHT: 163.04 LBS | BODY MASS INDEX: 24.15 KG/M2

## 2021-06-03 ENCOUNTER — OFFICE VISIT - HEALTHEAST (OUTPATIENT)
Dept: INTERNAL MEDICINE | Facility: CLINIC | Age: 76
End: 2021-06-03

## 2021-06-03 VITALS — BODY MASS INDEX: 24.44 KG/M2 | HEIGHT: 69 IN | WEIGHT: 165 LBS

## 2021-06-03 VITALS — WEIGHT: 165 LBS | BODY MASS INDEX: 24.44 KG/M2 | HEIGHT: 69 IN

## 2021-06-03 DIAGNOSIS — E88.810 METABOLIC SYNDROME: ICD-10-CM

## 2021-06-03 DIAGNOSIS — I10 ESSENTIAL HYPERTENSION: ICD-10-CM

## 2021-06-03 DIAGNOSIS — Z98.1 S/P LUMBAR FUSION: ICD-10-CM

## 2021-06-03 DIAGNOSIS — Z01.818 PREOP GENERAL PHYSICAL EXAM: ICD-10-CM

## 2021-06-03 DIAGNOSIS — H25.9 SENILE CATARACT OF RIGHT EYE, UNSPECIFIED AGE-RELATED CATARACT TYPE: ICD-10-CM

## 2021-06-03 DIAGNOSIS — M54.2 NECK PAIN: ICD-10-CM

## 2021-06-03 DIAGNOSIS — F51.04 CHRONIC INSOMNIA: ICD-10-CM

## 2021-06-03 RX ORDER — HYDROCHLOROTHIAZIDE 12.5 MG/1
12.5 TABLET ORAL DAILY
Qty: 90 TABLET | Refills: 3 | Status: SHIPPED | OUTPATIENT
Start: 2021-06-03 | End: 2022-06-02

## 2021-06-03 RX ORDER — CELECOXIB 200 MG/1
200 CAPSULE ORAL 2 TIMES DAILY
Qty: 180 CAPSULE | Refills: 3 | Status: SHIPPED | OUTPATIENT
Start: 2021-06-03 | End: 2022-06-02

## 2021-06-03 RX ORDER — ZOLPIDEM TARTRATE 10 MG/1
TABLET ORAL
Qty: 90 TABLET | Refills: 1 | Status: SHIPPED | OUTPATIENT
Start: 2021-06-03 | End: 2022-06-02

## 2021-06-03 RX ORDER — AMLODIPINE BESYLATE 5 MG/1
5 TABLET ORAL DAILY
Qty: 90 TABLET | Refills: 3 | Status: SHIPPED | OUTPATIENT
Start: 2021-06-03 | End: 2022-12-02

## 2021-06-04 ENCOUNTER — COMMUNICATION - HEALTHEAST (OUTPATIENT)
Dept: INTERNAL MEDICINE | Facility: CLINIC | Age: 76
End: 2021-06-04

## 2021-06-05 VITALS
BODY MASS INDEX: 24.44 KG/M2 | SYSTOLIC BLOOD PRESSURE: 122 MMHG | OXYGEN SATURATION: 98 % | HEIGHT: 69 IN | RESPIRATION RATE: 16 BRPM | HEART RATE: 72 BPM | DIASTOLIC BLOOD PRESSURE: 70 MMHG | WEIGHT: 165 LBS

## 2021-06-05 VITALS
WEIGHT: 172.5 LBS | BODY MASS INDEX: 25.66 KG/M2 | DIASTOLIC BLOOD PRESSURE: 64 MMHG | TEMPERATURE: 98.3 F | SYSTOLIC BLOOD PRESSURE: 108 MMHG | HEART RATE: 67 BPM | OXYGEN SATURATION: 96 %

## 2021-06-08 NOTE — TELEPHONE ENCOUNTER
Dr. Paula,  Please advise on how you would like to proceed.  Thank you.  Maame LONG, HUNTER/CMT....................8:56 AM

## 2021-06-08 NOTE — TELEPHONE ENCOUNTER
Patient Returning Call  Reason for call:  Maame from [facility] called back.  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  Okay to leave a detailed message?: Yes

## 2021-06-08 NOTE — TELEPHONE ENCOUNTER
I have never met this patient, thus I think an xray would make more sense at this point instead of doing a peer to peer. Can you please place that order? Thank you.

## 2021-06-08 NOTE — TELEPHONE ENCOUNTER
Who is calling:  [facility]  Reason for Call:  Pt is having xray performed today, however peer to peer was not done last night.  Pt will be returning to [facility] to do the 3rd MRI on tib/fib but this needs to go through insurance.  Contact information is in the 1st message in this strand.  Insurance company needs to know why MRI is needed.  Pt is having surgery Friday 05/29/20.  Please advise ASAP  Date of last appointment with primary care: N/A  Okay to leave a detailed message: Yes

## 2021-06-08 NOTE — TELEPHONE ENCOUNTER
I attempted the peer to peer. They noted an MRI of the leg, separate from the lumbar and knee MRI ordered by Dr Liz from 5/21/20. They also inquired documentation for this additional third MRI study and inquired about results from the xray. I informed them I would reach out to the to patient more info on who ordered the MR of the leg and result of the xray? It was postponed until I gather more information  Could you please inquire about these issue?   Thank you.

## 2021-06-08 NOTE — TELEPHONE ENCOUNTER
Peer to peer would need to be done ASAP as patient's appointment is scheduled for tomorrow for MRI.    Dimple KENYON LPN .......... 2:39 PM  05/26/20  [facility]

## 2021-06-08 NOTE — TELEPHONE ENCOUNTER
Dr. Paula,  Spoke with the patient and relayed message below.  He states that the reason the MRI was ordered instead of an X-ray is to evaluate the patient's peroneal nerve.  Patient believes that it may be compressing the nerve and causing his pain.  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  Patient would like to know if you would be willing to do the peer to peer review so he can get the MRI.  Please advise.  Thank you.  Maame LONG, HUNTER/CMT....................1:47 PM

## 2021-06-08 NOTE — TELEPHONE ENCOUNTER
Who is calling:  Rekha from [facility]  Reason for Call:  The patient is scheduled for a MRI on 5/27/20 of the right knee and the comments state include lower right leg. The current imaging will capture fibular head but if additional distal imaging is required, [facility] will need dedicated tibular and fibular orders or a verbal update  Date of last appointment with primary care:   Okay to leave a detailed message: Yes

## 2021-06-08 NOTE — TELEPHONE ENCOUNTER
Who is calling:  Yari from [facility]  Reason for Call:   The caller was checking on the status of the follow up from peer to peer regarding the MRI's the patient has scheduled tomorrow at 7PM. This writer informed  the caller of Dr. Paula's note below regarding peer to peer after 5PM.   Date of last appointment with primary care:   Okay to leave a detailed message: Yes. Please call [facility] with the status of the authorization post peer to peer conversation.

## 2021-06-08 NOTE — TELEPHONE ENCOUNTER
Dr. Palua,  Patient is wanting the MRI done to evaluate the peroneal nerve.  This can not be done with an Xray.  That was why the MRI was ordered to evaluate the nerve.  Maame LONG, CMA/CMT....................4:04 PM

## 2021-06-08 NOTE — TELEPHONE ENCOUNTER
Spoke with [facility] and they will fax me the x-ray results. The OV notes are not dictated yet from his visit, but the xray is done.    The x-ray is non-remarkable for any fracture.     Will forward to you once I get the fax.    Dimple KENYON LPN .......... 1:39 PM  05/27/20  [facility]

## 2021-06-08 NOTE — TELEPHONE ENCOUNTER
Called and spoke with Josefa at [facility] and gave verbal orders below. Nothing further needed.    Jazzmine Rossi, CMA

## 2021-06-08 NOTE — TELEPHONE ENCOUNTER
Dr. Paula is doing peer to peer review today at 11:00AM with insurance.    Patient is going to [facility] in [place] for an xray today.    I did let Yari know at [facility] also.    Plan is to continue with peer to peer as planned to see what insurance says.    Dimple KENYON LPN .......... 10:17 AM  05/27/20  [facility]

## 2021-06-08 NOTE — TELEPHONE ENCOUNTER
Spoke with [facility] and they are going to contact Dr. Valdes whom ordered the MRI for the tib/fib for the peer to peer if its needed.    Patient did complete the x-ray so peer to peer might not even be needed anymore.    [facility] stated they will get it corrected and contact those that need to be contacted.    No further information needed from us.    Dimple KENYON LPN .......... 2:12 PM  05/27/20  [facility]

## 2021-06-08 NOTE — TELEPHONE ENCOUNTER
Upcoming Appointment Question  When is the appointment: Tomorrow  What is your appointment for?: MRI right knee, tibia, and fibia and lumbar  Who is your appointment scheduled with?: [facility] in [place]  What is your question/concern?: Estela from [facility] insurance department stated the the MRI right tibia and fibula require an x-ray to be done prior to the MRI or they will not cover the cost. Caller stated Derek Mccord MD can otherwise do a [id number] prior authorization by calling [phone], case # [id number].     Please let them know his decision.  Okay to leave a detailed message?: No  [phone]

## 2021-06-12 NOTE — PROGRESS NOTES
Goal cholesterol less than 200  Increase atorvastatin 20 mg    Lab Results   Component Value Date    CHOL 234 (H) 08/02/2017    CHOL 289 (H) 09/12/2016     Lab Results   Component Value Date    HDL 65 08/02/2017    HDL 99 09/12/2016     Lab Results   Component Value Date    LDLCALC 148 (H) 08/02/2017    LDLCALC 169 (H) 09/12/2016     Lab Results   Component Value Date    TRIG 107 08/02/2017    TRIG 103 09/12/2016     No components found for: CHOLHDL

## 2021-06-14 NOTE — PROGRESS NOTES
Neurosurgery consultation was requested by: Dr. Calderón   Pain: Left leg   Radicular Pain is present: Left leg   Lhermitte sign: no  Motor complaints: No weakness   Sensory complaints: Numbness and tingling in lower left leg   Gait and balance issues: None   Bowel or bladder issues: None   Duration of SX is: 7 weeks   The symptoms are worse with: Flexing/movements and standing up from sitting too long    The symptoms are better with:   Injury: No  Severity is:   Patient has tried the following conservative measures: Injections, previous surgery July 2020   Gisela Dai CMA,9:07 AM     Modified Oswestry Low back Pain Disability Questionnaire    1. PAIN INTENSITY  1- The pain is bad, but I manage without taking pain medication.  2. PERSONAL CARE  0- I can take care of myself normally without causing  increased pain.   3. LIFTING  2- Pain prevents me from lifting heavy weights off the floor but I can manage if they are conveniently placed  4. WALKING  1- Pain prevents me from walking more than 1 mile  5. SITTING  0- I can sit in any chair as long as I like.  6. STANDING  1- I can stand as long as I want, but it increases my pain.  7. SLEEPING  2- Even when I take medication, I sleep less than 6 hours.  8. SOCIAL LIFE  2- Pain prevents me from participating in more energetic activities (e.g., sports, dancing).  9. TRAVELING  1- I can travel anywhere, but it increases my pain.  10. EMPLOYMENT/HOMEMAKING  2- I can perform most of my homemaking/job activities, but pain prevents me from performing more physically stressful activities (e.g. lifting, vacuuming).    SCORE:12 x2=24%

## 2021-06-14 NOTE — PROGRESS NOTES
OFFICE VISIT NOTE  lCayton Vaca MD   72 y.o. 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  No Patient Care Coordination Note on file.       There are no diagnoses linked to this encounter.   1.  Neck/left shoulder pain  2.  Chest wall dysesthesia/atypical chest pain-question neuritic  3.  Cervical arthritis/foraminal stenosis  4.  Asymptomatic 4.1 cm ascending aortic aneurysm-incidentally discovered  5.  Hyperlipidemia goal LDL less than 70-90  6.  Abnormal coronary calcium score  7.  Family history of gastric cancer  8.  History of gastric ulcer   9.  Nephrolithiasis recurrent  10.  Chronic insomnia.  Irregular International/local travel schedule  11.  Hypertension-excellent control goal BP less than 120/80.  He is on ACE inhibitor.  Could consider beta-blocker.      I think his discomfort is due to neuritis probably emanating from the neck area.  I do not think this is related to his incidentally discovered small aortic aneurysm.  I did review the reports on that.  There is no evidence of dissection.  CT scan old read shows no lesions although this is limited.  Patient has had success with Neurontin past.  Also might help him sleep.  Some dysphoria with gabapentin.  We will try Lyrica.  I will MRI his neck and shoulder.  He does have follow-up with preventive cardiology.  He gets a lot of radiation.  Would consider follow-up either with MRI or cardiac echocardiogram probably in 12 months    Hopefully the Lyrica will help him sleep as well.  I did refill his sleep medication.  With the family history of gastric cancer will get endoscopy.  He does not have any GI symptoms.  He is also due for colonoscopy.  He is with by Dr. Jesica Goodrich in [place].      His weight is down a bit.  He has had some dietary modification.  He has a good appetite.  He looks well.  He has an unremarkable exam      Plan:  Laboratory  MRI shoulder neck  Upper and lower GI endoscopy  Gabapentin twice daily for  discomfort/sleep  Cardiology follow-up  Internal medicine follow-up 60 weeks        There are no Patient Instructions on file for this visit.    Diagnoses and all orders for this visit:    Aneurysm, ascending aorta  -     Lipid Cascade; Future; Expected date: 11/7/17  -     Hepatic Profile  -     Basic Metabolic Panel  -     Erythrocyte Sedimentation Rate  -     Lipid Revelo    Neck pain  -     MR Cervical Spine Without Contrast; Future; Expected date: 11/7/17  -     pregabalin (LYRICA) 25 MG capsule; Take 1 capsule (25 mg total) by mouth 3 (three) times a day.  Dispense: 90 capsule; Refill: 2    Dysesthesia  -     pregabalin (LYRICA) 25 MG capsule; Take 1 capsule (25 mg total) by mouth 3 (three) times a day.  Dispense: 90 capsule; Refill: 2    Hyperlipidemia  -     MR Shoulder Without Contrast Left; Future; Expected date: 11/7/17  -     Thyroid Cascade    Shoulder pain, left  -     MR Shoulder Without Contrast Left; Future; Expected date: 11/7/17    HTN (hypertension)    Colon polyp  -     Ambulatory referral for Colonoscopy    Family history of gastric ulcer  -     Ambulatory referral for Upper GI Endoscopy    Chronic insomnia  -     ALPRAZolam (XANAX) 0.5 MG tablet; TAKE 1 TABLET BY MOUTH AS NEEDED FOR SLEEP OR ANXIETY  Dispense: 90 tablet; Refill: 1  -     zolpidem (AMBIEN) 10 mg tablet; Take 1 tablet (10 mg total) by mouth at bedtime as needed for sleep.  Dispense: 90 tablet; Refill: 1    Prostate cancer  -     PSA (Prostatic-Specific Antigen), Diagnostic        Medications after visit  Current Outpatient Prescriptions   Medication Sig Dispense Refill     ALPRAZolam (XANAX) 0.5 MG tablet TAKE 1 TABLET BY MOUTH AS NEEDED FOR SLEEP OR ANXIETY 90 tablet 1     aspirin 81 MG EC tablet Take 81 mg by mouth daily.       atorvastatin (LIPITOR) 40 MG tablet Take 1 tablet (40 mg total) by mouth daily. 90 tablet 3     losartan (COZAAR) 100 MG tablet Take 1 tablet (100 mg total) by mouth daily. 30 tablet 11     sildenafil  (VIAGRA) 50 MG tablet Take 1 tablet (50 mg total) by mouth as needed for erectile dysfunction. 30 tablet 3     zolpidem (AMBIEN) 10 mg tablet Take 1 tablet (10 mg total) by mouth at bedtime as needed for sleep. 90 tablet 1     pregabalin (LYRICA) 25 MG capsule Take 1 capsule (25 mg total) by mouth 3 (three) times a day. 90 capsule 2     No current facility-administered medications for this visit.              This provider spent greater than 40 min. face-to-face time with the patient and/or his family.  More than half this time was spent in counseling and or coordination of care with other providers or agencies which were consistent with the nature of this patient's problems which are listed and described in the assessment and plan.        Derek Mccord MD  Internal medicine  [facility]  [phone]  [email or web address]      This is an electronically verified report by Derek Mccord M.D.  (Note created with Dragon voice recognition and unintended spelling errors and word substitutions may occur)               Subjective:   Chief Complaint:  Weight Loss; Hypercholestemia; Tingling (Left sided. Chest wall into back/shoulder.); Medication Refill; and Immunizations (Prevnar or Poly 23)    Patient has several concerns    Primarily his dysesthesias in the left shoulder left neck area with some headache.  Patient has had a lot of neck surgery.  He has normal range of motion  He notes this is not related to activity.  He has had this for several months    He has had a cardiac evaluation because of some dyspnea this past year in [place].  He had some edema.  His blood pressure was elevated.  It has been nicely treated with lisinopril.  He has no exertional chest pain.  Nuclear stress was negative.  Coronary calcium score was positive but less than 100.  25th percentile.  Incidental CT did reveal 4.1 cm ascending aortic aneurysm.  No evidence of dissection.  Limited CT of chest  negative      Patient is tolerating his lipid therapy goal LDL less than 7090    He has lost a little weight and he is concerned about this.  He did modify his diet.  He is eating a lot less refined sweets.  He is down about 5-6 pounds.  He really has no GI symptoms.  No dyspepsia dysphagia early satiety etc. he has no abdominal pain.  He has history of colon polyps.  His family history of gastric cancer.  He has surveillance with GI-Dr. Jesica Goodrich both upper and lower endoscopy.    Review of Systems:     Extensive 10-point review of systems was performed. Please see the HPI for problem specific pertinent review of systems.     Patient does note appetite is good.  No GERD symptoms  No dyspepsia  He has modified his diet  He has cut down on refined carbohydrates  No melena no hematochezia.    Patient sent prostatectomy.  His urination stream is good    Otherwise, the following systems are noncontributory including constitutional, eyes, ears, nose and throat, cardiovascular, respiratory, gastrointestinal, genitourinary, musculoskeletal,neurological, skin and/or breast, endocrine, hematologic/lymph, allergic/immunologic and psychiatric.              Medications:  Current Outpatient Prescriptions on File Prior to Visit   Medication Sig     aspirin 81 MG EC tablet Take 81 mg by mouth daily.     atorvastatin (LIPITOR) 40 MG tablet Take 1 tablet (40 mg total) by mouth daily.     losartan (COZAAR) 100 MG tablet Take 1 tablet (100 mg total) by mouth daily.     sildenafil (VIAGRA) 50 MG tablet Take 1 tablet (50 mg total) by mouth as needed for erectile dysfunction.     No current facility-administered medications on file prior to visit.             Allergies:  Allergies   Allergen Reactions     Demerol [Meperidine] Nausea Only     Morphine (Pf) Nausea Only       PSFHx: Tobacco Status:  He  reports that he has never smoked. He has never used smokeless tobacco.   Alcohol Status:    History   Alcohol Use     Yes     Comment:  "occas       reports that he has never smoked. He has never used smokeless tobacco. He reports that he drinks alcohol. He reports that he does not use illicit drugs.    Objective:    /88 (Patient Position: Standing)  Pulse 74  Ht 5' 8.75\" (1.746 m)  Wt 165 lb (74.8 kg)  SpO2 98%  BMI 24.54 kg/m2  Weight:   Wt Readings from Last 3 Encounters:   11/07/17 165 lb (74.8 kg)   09/27/17 166 lb (75.3 kg)   09/26/17 167 lb (75.8 kg)     Wt Readings from Last 10 Encounters:   11/07/17 165 lb (74.8 kg)   09/27/17 166 lb (75.3 kg)   09/26/17 167 lb (75.8 kg)   05/21/17 168 lb (76.2 kg)   05/17/16 171 lb (77.6 kg)   04/25/16 171 lb 15.3 oz (78 kg)   05/11/15 170 lb (77.1 kg)   05/11/15 171 lb (77.6 kg)           BP Readings from Last 3 Encounters:   11/07/17 120/88   09/26/17 120/72   05/21/17 (!) 159/97         General-appears well, no acute distress.  Skin: Normal. No rash or lesion  Head:  Normocephalic, symmetric  Speech-clear  Eyes: Eyes midline full EOM.  External exams normal.  No icterus  Neck:  No palpable masses, lymphadenopathy or tenderness. No thyromegaly or goiter.  Signal node negative.  Well-healed scars  Carotid Arteries:  Equal pulsations bilateral.No Bruit, normal upstroke  Chest Wall: No deformity or pain elicited on compression.  Respiratory:  Normal respiratory effort.  Lungs are clear with good breath sounds.  No dullness.  No wheezing.  Heart: Regular rhythm.  Normal sounding S1, S2 without S3, S4, murmurs, rubs, or gallops.  Extremities-no edema excellent pulses  Abdomen-benign  Shoulder range of motion normal.         Review of clinical lab tests  Lab Results   Component Value Date    WBC 8.4 05/21/2017    HGB 14.1 05/21/2017    HCT 40.7 05/21/2017     05/21/2017    CHOL 168 11/07/2017    TRIG 79 11/07/2017    HDL 54 11/07/2017    ALT 15 11/07/2017    AST 26 11/07/2017     11/07/2017    K 4.0 11/07/2017     11/07/2017    CREATININE 0.93 11/07/2017    BUN 18 11/07/2017    CO2 " 24 11/07/2017    TSH 1.88 11/07/2017    PSA <0.1 09/12/2016       Glucose   Date/Time Value Ref Range Status   11/07/2017 02:24 PM 91 70 - 125 mg/dL Final   05/21/2017 12:09 PM 93 70 - 125 mg/dL Final   09/12/2016 12:32 PM 79 74 - 125 mg/dL Final   02/16/2010 05:27  (H) 70 - 125 mg/dL Final     Recent Results (from the past 24 hour(s))   Hepatic Profile   Result Value Ref Range    Bilirubin, Total 0.4 0.0 - 1.0 mg/dL    Bilirubin, Direct 0.2 <=0.5 mg/dL    Protein, Total 6.9 6.0 - 8.0 g/dL    Albumin 4.0 3.5 - 5.0 g/dL    Alkaline Phosphatase 89 45 - 120 U/L    AST 26 0 - 40 U/L    ALT 15 0 - 45 U/L   Basic Metabolic Panel   Result Value Ref Range    Sodium 140 136 - 145 mmol/L    Potassium 4.0 3.5 - 5.0 mmol/L    Chloride 106 98 - 107 mmol/L    CO2 24 22 - 31 mmol/L    Anion Gap, Calculation 10 5 - 18 mmol/L    Glucose 91 70 - 125 mg/dL    Calcium 9.5 8.5 - 10.5 mg/dL    BUN 18 8 - 28 mg/dL    Creatinine 0.93 0.70 - 1.30 mg/dL    GFR MDRD Af Amer >60 >60 mL/min/1.73m2    GFR MDRD Non Af Amer >60 >60 mL/min/1.73m2   Thyroid Cascade   Result Value Ref Range    TSH 1.88 0.30 - 5.00 uIU/mL   Erythrocyte Sedimentation Rate   Result Value Ref Range    Sed Rate 8 0 - 15 mm/hr   Lipid Cascade   Result Value Ref Range    Cholesterol 168 <=199 mg/dL    Triglycerides 79 <=149 mg/dL    HDL Cholesterol 54 >=40 mg/dL    LDL Calculated 98 <=129 mg/dL    Patient Fasting > 8hrs? Yes        RADIOLOGY: Ct Chest Over Read    Result Date: 9/27/2017  OVERREAD: DETAILED [facility] EXTRACARDIAC OVERREAD OF CARDIAC CT 9/27/2017 7:27 AM INDICATION: Hypercholesterolemia. Dyspnea TECHNIQUE: Dose reduction techniques were used. COMPARISON: None. FINDINGS:  LIMITED CHEST: Negative. LIMITED MEDIASTINUM: Small ascending thoracic aortic aneurysm measuring 4.1 cm. At the sinotubular ridge 3.1 cm. LIMITED UPPER ABDOMEN: Negative.     CONCLUSION:  1.  Limited lungs are clear. 2.  Small 4.1 cm ascending thoracic aortic aneurysm. 3.   Please refer to cardiologist's dictation for the cardiac CT report.     Ct Cardiac Calcium Score    Result Date: 9/27/2017    The total Agatston calcium score is 90. A calcium score in this range places the individual in the 50th percentile when compared to an age and gender matched control group and implies a moderate risk of cardiac events in the next ten years.        Review of recent consultation-cardiology consultation

## 2021-06-14 NOTE — PROGRESS NOTES
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 Alex.  He has had left-sided L5 radicular pain with minimal back pain for several years which has been under control with epidural steroid injections.  Most recently about 3 weeks ago he had epidural steroid injection on the left at L4-5 with complete resolution of his pain however this lasted only 3 weeks.  He is very active and athletic in a skiing golfing and fishing.  Denies any bowel bladder incontinence.  Of note is brother is an orthopedic surgeon that reviewed his images and had recommended follow-up 3 4 and L4-5 fusion with interbody at L3-4.  He does have a flexion-extension x-ray dated 11/24/2020 which shows 1 to 2 mm of movement on flexion-extension, 6 mm in flexion and 4 to 5 mm in extension.  His MRI of the spine dated 11/24/2020 shows postoperative changes at L3-4 and L4-5 on the right with L3-4 spinal listhesis with left subarticular narrowing encroaching the left L4 nerve root and left L4-5 disc bulge encroaching the left L5 nerve root.    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  This responded very well to injection but only lasted 3 weeks with his most recent  injection.  I reviewed his imaging with him in detail.  He could certainly benefit from a redo microdiscectomy on the left side at L4-5.  However he is more concerned about the long-term need for fusion especially given his spinal listhesis at L3-4.  Given the fact that his translation is less than 3 mm on flexion-extension x-rays I do not feel that he is unstable at this point and at this level but that he likely will need a fusion down the road.  If you were to go through with the fusion I would recommend left-sided Squires laminectomy and fusion posterior laterally at L3-4 and L4-5 with possible interbody on the left at L3-4.  I reviewed the risk benefits alternatives in detail with him.  He will think about this and discuss it with his brother and let us know if he wishes to proceed.  Thank you for giving me the opportunity to see this very pleasant patient.  If you have any questions about Dr. Vaca or any other patients please feel free to contact me.  Of note I spent greater than 40 minutes counseling the patient regarding his pathology and treatment plan, greater than 50 percent of which was in direct counseling.    Katia Hogue MD, FAANS

## 2021-06-15 NOTE — PROGRESS NOTES
Office Visit - Follow Up   Clayton Vaca MD   75 y.o. 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  He is generally been doing well, and recovered nicely from lumbar fusion and decompression surgery performed January 2021 at [facility].  Issues are as follows: Insomnia and anxiety, for which we will get a try trazodone as a substitute for Ambien, also the alprazolam on hand.  Status post lumbar fusion of L3-4 and decompression of L4-5 done at [facility] January 12, 2021 without complications, will be receiving core muscle strengthening soon.  Chronic cervical radiculopathy, with history of 4 neck procedures, fused from C4-C6, he will be getting an epidural injection on the left side March 22, and has muscle atrophy of his left trapezius, uses Celebrex for pain relief.  Benign essential hypertension, lets decrease his losartan but keep hydrochlorothiazide and amlodipine on board, because of his other medical conditions of kidney stones and Raynaud's phenomena.  Raynaud's phenomenon, probably reasonable to stay on the calcium channel blocker amlodipine for that reason.  History of dilation of the ascending aorta, was seen on CT scan in 2017, let us check this with a CT angio, no symptoms, and his cardiac auscultation is normal.  Hyperlipidemia, with no history of cardiovascular events, on high intensity atorvastatin 40 mg a day, with LDL cholesterol under 100 when checked March 2020.  Prostate cancer, status post radical prostatectomy in the late 1990s, undetectable PSAs.  Mild normocytic anemia has been observed since 2017, with  hemoglobin that runs in the 13 g range.  Status post right total hip arthroplasty May 2019, then with heterotopic bone excision and psoas tendon release March 2020 at [place].  Nephrolithiasis, known to have stones residing in both kidneys, currently asymptomatic, microhematuria has been observed.  Glaucoma, on eyedrops.  Satisfactory screening colonoscopy December 2017 with sigmoid diverticulosis, no polyps, recheck optional 5 years after that.  Erectile dysfunction, and low testosterone levels, started on topical testosterone in January 2021, also uses high-dose sildenafil.  Up-to-date on vaccinations although he could benefit from getting a Prevnar 13 pneumococcal, received a second shot of Pfizer COVID-19 vaccine January 25, 2021.    He does not need any laboratory test today.  Prescription issued for trazodone 50 mg at bedtime.  CT angio of the chest ordered    Going issue by issue:    Insomnia and anxiety, for which we will get a try trazodone as a substitute for Ambien, also the alprazolam on hand.  He travels a lot and keeps alprazolam on hand for that.  He does not when he used too much Ambien and I agree.  We will try trazodone, he might even split the 50 mg tablet in half and start with 25 mg at bedtime.  Trazodone is regarded as nonhabit-forming.  He told me that he does do some screen time in bed, and I suggested using blue blocker glasses or set the device to evening mode to reduce the white/blue light which suppresses melatonin levels.  Another thing he might consider is over-the-counter melatonin, probably 1 to 2 mg for starters, although some patients take up to 5 mg.  I reminded about the importance of restful evening routine, consider meditation, stretching exercises, etc.  He has a comfortable pillow to support his troublesome neck.    Status post lumbar fusion of L3-4 and decompression of L4-5 done at [facility] January 12, 2021 without complications, will be receiving core muscle  strengthening soon.       Chronic cervical radiculopathy, with history of 4 neck procedures, fused from C4-C6, he will be getting an epidural injection on the left side March 22, and has muscle atrophy of his left trapezius, uses Celebrex for pain relief.  Ibuprofen has bothered his stomach so he staying away from that.  He has used some naproxen as well but will probably offer Celebrex.    Benign essential hypertension, lets decrease his losartan but keep hydrochlorothiazide and amlodipine on board, because of his other medical conditions of kidney stones and Raynaud's phenomena. Today's in clinic blood pressure of 108/64 probably does not need to be driven down that low.  I will prescribe for him losartan 50 mg capsules,    Raynaud's phenomenon, probably reasonable to stay on the calcium channel blocker amlodipine for that reason.      History of dilation of the ascending aorta, was seen on CT scan in 2017, let us check this with a CT angio, no symptoms, and his cardiac auscultation is normal.  No symptoms to suggest aortic dissection, and I do not hear any aortic valve murmurs.    Hyperlipidemia, with no history of cardiovascular events, on high intensity atorvastatin 40 mg a day, with LDL cholesterol under 100 when checked March 2020.  He had a coronary calcification scan with a score less than 100.    Prostate cancer, status post radical prostatectomy in the late 1990s, undetectable PSAs.  His testosterone therapy is being supervised by urology.    Mild normocytic anemia has been observed since 2017, with hemoglobin that runs in the 13 g range.  Hemoglobin levels would need to be monitored while on testosterone, perhaps a mild increase   into the normal range.    Status post right total hip arthroplasty May 2019, then with heterotopic bone excision and psoas tendon release March 2020 at [place].      Nephrolithiasis, known to have stones residing in both kidneys, currently asymptomatic, microhematuria has been  observed.  I reminded him about the importance of staying well-hydrated.  1 thing we might be giving up by dropping hydrochlorothiazide is that hydrochlorothiazide tends to decrease calcium excretion and is sometimes used to suppress formation of stones.  We decided to keep the hydrochlorothiazide going for that reason.    Glaucoma, on eyedrops.      Satisfactory screening colonoscopy December 2017 with sigmoid diverticulosis, no polyps, recheck optional 5 years after that.      Erectile dysfunction, and low testosterone levels, started on topical testosterone in January 2021, also uses high-dose sildenafil.      Up-to-date on vaccinations although he could benefit from getting a Prevnar 13 pneumococcal, received a second shot of Pfizer COVID-19 vaccine January 25, 2021.      --------------------------------------------------------------------------------------------------------------------------  History of Present Illness  This 75 y.o. 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  He is generally been doing well, and recovered nicely from lumbar fusion and decompression surgery performed January 2021 at [facility].  Issues are as follows: Insomnia and anxiety, for which we will get a try trazodone as a substitute for Ambien, also the alprazolam on hand.  Status post lumbar fusion of L3-4 and decompression of L4-5 done at [facility] January 12, 2021 without complications, will be receiving core muscle strengthening soon.  Chronic cervical radiculopathy, with history of 4 neck procedures, fused from C4-C6, he will be getting an epidural injection on the left side March 22, and has muscle atrophy of his left trapezius, uses Celebrex for pain relief.  Benign essential hypertension, lets decrease his losartan but keep hydrochlorothiazide and amlodipine on board, because of his other medical conditions of  kidney stones and Raynaud's phenomena.  Raynaud's phenomenon, probably reasonable to stay on the calcium channel blocker amlodipine for that reason.  History of dilation of the ascending aorta, was seen on CT scan in 2017, let us check this with a CT angio, no symptoms, and his cardiac auscultation is normal.  Hyperlipidemia, with no history of cardiovascular events, on high intensity atorvastatin 40 mg a day, with LDL cholesterol under 100 when checked March 2020.  Prostate cancer, status post radical prostatectomy in the late 1990s, undetectable PSAs.  Mild normocytic anemia has been observed since 2017, with hemoglobin that runs in the 13 g range.  Status post right total hip arthroplasty May 2019, then with heterotopic bone excision and psoas tendon release March 2020 at [place].  Nephrolithiasis, known to have stones residing in both kidneys, currently asymptomatic, microhematuria has been observed.  Glaucoma, on eyedrops.  Satisfactory screening colonoscopy December 2017 with sigmoid diverticulosis, no polyps, recheck optional 5 years after that.  Erectile dysfunction, and low testosterone levels, started on topical testosterone in January 2021, also uses high-dose sildenafil.  Up-to-date on vaccinations although he could benefit from getting a Prevnar 13 pneumococcal, received a second shot of Pfizer COVID-19 vaccine January 25, 2021.        Anxiety disorder  Insomnia   Alprazolam 0.5 mg tablet once daily PRN, zolpidem 10 mg tablet at bedtime if needed.     Insomnia, unspecified type  On as needed Ambien  Willing to try trazodone    Wine 3-4 glasses a week  Does screen time    Chronic cervical radiculopathy, left; atrophy left trap  Has had 4 c-spine procedures: fused C4-6  Will get epidural March 22  He still golfs    celecoxib (CELEBREX) 200 MG capsule      S/p lumbar surgery, fused L3-4  L4-5 decomp  [facility]  Assessment: s/p L3-L4 dilat decompression-laminectomy, L3-L4 PSF with  instrumentation, L3-L4 transforaminal lumbar interbody fusion, L4-L5 PSF with instrumentation, revision L4-L5 decompression on 2021 with Ranulfo Baker.   Plan:   - diet, activity, pain control, prophylaxis per surgery  Admission Date: 2021   Discharge Date: 2021  Mr. Clayton Vaca is a 75 y.o. who underwent L3-L4 Bilat Decompression-Laminectomy, L3-L4 PSF with Instrumentation, L3-L4 Transforaminal Lumbar Interbody Fusion, Revison L4-L5 decompression, FUSION POSTERIOR SPINE LEVEL 01 on 2021 by Dr. Lemon, Nolan Garcia MD    Benign essential hypertension  amLODIPine (NORVASC) 5 MG tablet  losartan (COZAAR) 100 MG tablet  hydroCHLOROthiazide (HYDRODIURIL) 12.5 MG tablet ()  BP Readings from Last 3 Encounters:   21 108/64   20 122/70   19 122/80     Lab Results   Component Value Date    CREATININE 1.11 2020    BUN 29 (H) 2020     2020    K 4.2 2020     2020    CO2 24 2020     Thoracic ascending aortic aneurysm (H)  -- CT  Willing to get CT angio    Mixed hyperlipidemia  on lipitor 40 mg tablet once daily.   Agatston coronary artery calcium score less than 100     Lab Results   Component Value Date    CHOL 191 2020    CHOL 210 (H) 2019    CHOL 176 2018     Lab Results   Component Value Date    HDL 65 2020    HDL 69 2019    HDL 54 2017     Lab Results   Component Value Date    LDLCALC 97 2020    LDLCALC 127 2019    LDLCALC 98 2017     Lab Results   Component Value Date    TRIG 143 2020    TRIG 71 2019    TRIG 79 2017     No components found for: CHOLHDL    Prostate Cancer  flomax    Lab Results   Component Value Date    PSA <0.1 2020    PSA <0.1 2019    PSA <0.1 2016       Mild anemia  Lab Results   Component Value Date    HGB 13.8 (L) 2020     Primary osteoarthritis of right hip  Assessment: s/p right total hip arthroplasty  5/9/2019 with Dr. Adarsh Frey at [facility].   S/p right hip arthroscopic heterotopic bone excision and iliopsoas lengthening on 3/17/2020 with Dr. Coe at [place].   5/9/2019  Adarsh Frey MD   ASSISTANT   POSTOPERATIVE DIAGNOSIS   Right hip osteoarthritis  PROCEDURE  Conversion of previous hip surgery to   Right hip replacement  Implants  [employer] Polar femur, size 6  32mm ceramic head, size +0  Smith [employer]hew R3 cup, size 52  Smith and [employer]hew polyethylene insert, size 52, 32    Kidney stone history  Hematuria, microscopic  Stones bilateral on left    Glaucoma  latanoprost (XALATAN) 0.005 % ophthalmic solution  timoloL maleate (TIMOPTIC) 0.5 % ophthalmic solution  [facility]    Colonoscopy 12-28-17  Divertics, no polyps    ED  sildenafil (VIAGRA) 100 MG tablet  testosterone (ANDROGEL PUMP) 20.25 mg/1.25 gram (1.62 %) GlPm gel- Urology approved, started January 2021    Needs Prevnar 13  Immunization History   Administered Date(s) Administered     COVID-19,PF,Pfizer 01/04/2021, 01/25/2021     INFLUENZA,SEASONAL QUAD, PF, =/> 6months 09/07/2020     Influenza [id number], 11/25/2009     Influenza high dose,seasonal,PF, 65+ yrs 10/18/2019     Influenza, inj, historic,unspecified 09/26/2017     Influenza,seasonal, Inj IIV3 10/15/2011, 09/27/2013     Influenza,trivalent,im, 65+yrs 10/18/2019     Pneumo Polysac 23-V 11/27/2013     Tdap 09/02/2011     ZOSTER, LIVE 10/27/2013     ZOSTER, RECOMBINANT, IM 12/01/2020         Lab Results   Component Value Date    COLORU Yellow 05/08/2019    CLARITYU Clear 05/08/2019    GLUCOSEU Negative 05/08/2019    BILIRUBINUR Negative 05/08/2019    KETONESU Negative 05/08/2019    SPECGRAV 1.015 05/08/2019    HGBUA Negative 05/08/2019    PHUR 7.0 05/08/2019    PROTEINUA Negative 05/08/2019    UROBILINOGEN 0.2 E.U./dL 05/08/2019    NITRITE Negative 05/08/2019    LEUKOCYTESUR Negative 05/08/2019    BACTERIA None Seen 04/30/2019    RBCUA >100 (!) 04/30/2019    WBCUA 0-5  04/30/2019    SQUAMEPI 0-5 04/30/2019         Lab Results   Component Value Date    WBC 8.2 12/29/2020    HGB 13.8 (L) 12/29/2020    HCT 40.8 12/29/2020     12/29/2020    CHOL 191 03/16/2020    TRIG 143 03/16/2020    HDL 65 03/16/2020    ALT 17 12/29/2020    AST 33 12/29/2020     12/29/2020    K 4.2 12/29/2020     12/29/2020    CREATININE 1.11 12/29/2020    BUN 29 (H) 12/29/2020    CO2 24 12/29/2020    TSH 1.49 07/31/2019    PSA <0.1 03/16/2020        Review of Systems: A comprehensive review of systems was negative except as noted.  ---------------------------------------------------------------------------------------------------------------------------    Medications, Allergies, Social, and Problem List   Current Outpatient Medications   Medication Sig Dispense Refill     amLODIPine (NORVASC) 5 MG tablet Take 1 tablet (5 mg total) by mouth daily. 30 tablet 11     aspirin 81 MG EC tablet Take 81 mg by mouth daily.       cyanocobalamin 1000 MCG tablet Take 1,000 mcg by mouth.       latanoprost (XALATAN) 0.005 % ophthalmic solution Apply 1 drop to eye.       losartan (COZAAR) 100 MG tablet Take 100 mg by mouth.       sildenafil (VIAGRA) 100 MG tablet Take 1 tablet (100 mg total) by mouth as needed for erectile dysfunction. 20 tablet 1     testosterone (ANDROGEL PUMP) 20.25 mg/1.25 gram (1.62 %) GlPm gel Place 2 Pump on the skin.       timoloL maleate (TIMOPTIC) 0.5 % ophthalmic solution INSTILL BY OPHTHALMIC ROUTE EVERY MORNING INTO BOTH EYES.       ALPRAZolam (XANAX) 0.5 MG tablet TAKE 1 TABLET BY MOUTH AS NEEDED FOR SLEEP OR ANXIETY 90 tablet 1     atorvastatin (LIPITOR) 40 MG tablet Take 1 tablet (40 mg total) by mouth daily. 90 tablet 3     celecoxib (CELEBREX) 200 MG capsule Take 1 capsule (200 mg total) by mouth daily. 30 capsule 2     hydroCHLOROthiazide (HYDRODIURIL) 12.5 MG tablet Take 1 tablet (12.5 mg total) by mouth daily. 30 tablet 2     ibuprofen (ADVIL,MOTRIN) 200 MG tablet Take 600  mg by mouth every 6 (six) hours as needed.       naproxen (NAPROSYN) 500 MG tablet naproxen 500 mg tablet       zolpidem (AMBIEN) 10 mg tablet TAKE 1 TABLET EVERY NIGHT AT BEDTIME AS NEEDED FOR SLEEP 90 tablet 1     No current facility-administered medications for this visit.      Allergies   Allergen Reactions     Demerol [Meperidine] Nausea Only     Morphine (Pf) Nausea Only     Nitrous Oxide Nausea Only     Social History     Tobacco Use     Smoking status: Never Smoker     Smokeless tobacco: Never Used     Tobacco comment: 2 cigars per month   Substance Use Topics     Alcohol use: Yes     Comment: occas     Drug use: No     Patient Active Problem List   Diagnosis     Calculus of ureter     Calculus of kidney     Essential hypertension     Hypercholesterolemia     Thoracic ascending aortic aneurysm (H)        Reviewed, reconciled and updated       Physical Exam   General Appearance:   Very pleasant, appears well, blood pressure well controlled, reasonable BMI 25.7    /64 (Patient Site: Right Arm, Patient Position: Sitting, Cuff Size: Adult Large)   Pulse 67   Temp 98.3  F (36.8  C) (Oral)   Wt 172 lb 8 oz (78.2 kg)   SpO2 96%   BMI 25.66 kg/m      General: Alert, in no distress  Skin: No significant lesion seen.  Eyes/nose/throat: Eyes without scleral icterus, eye movements normal, pupils equal and reactive, oropharynx clear, ears with normal TM's  MSK: + Mildly reduced extension range of motion of his neck  Lymphatic: Neck without adenopathy or masses  Endocrine: Thyroid with no nodules to palpation  Pulm: Lungs clear to auscultation bilaterally  Cardiac: Heart with regular rate and rhythm, no murmur or gallop  No carotid bruit  GI: Abdomen soft, nontender. No palpable enlargement of liver or spleen  MSK: Extremities no tenderness or edema  + Mild muscle atrophy involving his left trapezius  Neuro: Moves al no carotid bruit l extremities, without focal weakness  Psych: Alert, normal mental status.  Normal affect and speech       Additional Information   I spent 40 minutes on this encounter, including reviewing interval history since last visit, examining the patient, explaining and counseling the issues enumerated in the Assessment and Plan (patient given a copy), ordering indicated tests, ordering prescriptions     Wil Larson MD

## 2021-06-16 PROBLEM — E78.00 HYPERCHOLESTEROLEMIA: Status: ACTIVE | Noted: 2017-09-26

## 2021-06-16 PROBLEM — F41.9 ANXIETY: Status: ACTIVE | Noted: 2021-03-18

## 2021-06-16 PROBLEM — F51.01 PRIMARY INSOMNIA: Status: ACTIVE | Noted: 2021-03-18

## 2021-06-16 PROBLEM — Z98.1 S/P LUMBAR FUSION: Status: ACTIVE | Noted: 2021-03-18

## 2021-06-16 PROBLEM — Z98.1 S/P CERVICAL SPINAL FUSION: Status: ACTIVE | Noted: 2021-03-18

## 2021-06-16 PROBLEM — M54.12 CERVICAL RADICULOPATHY: Status: ACTIVE | Noted: 2021-03-18

## 2021-06-16 PROBLEM — I10 ESSENTIAL HYPERTENSION: Status: ACTIVE | Noted: 2017-09-26

## 2021-06-16 PROBLEM — D64.9 NORMOCHROMIC ANEMIA: Status: ACTIVE | Noted: 2021-03-18

## 2021-06-16 PROBLEM — N52.8 OTHER MALE ERECTILE DYSFUNCTION: Status: ACTIVE | Noted: 2021-03-18

## 2021-06-16 PROBLEM — I71.21 THORACIC ASCENDING AORTIC ANEURYSM (H): Status: ACTIVE | Noted: 2017-09-01

## 2021-06-16 PROBLEM — I73.00 RAYNAUD PHENOMENON: Status: ACTIVE | Noted: 2021-03-18

## 2021-06-16 NOTE — PATIENT INSTRUCTIONS - HE
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  He is generally been doing well, and recovered nicely from lumbar fusion and decompression surgery performed January 2021 at [facility].  Issues are as follows: Insomnia and anxiety, for which we will get a try trazodone as a substitute for Ambien, also the alprazolam on hand.  Status post lumbar fusion of L3-4 and decompression of L4-5 done at [facility] January 12, 2021 without complications, will be receiving core muscle strengthening soon.  Chronic cervical radiculopathy, with history of 4 neck procedures, fused from C4-C6, he will be getting an epidural injection on the left side March 22, and has muscle atrophy of his left trapezius, uses Celebrex for pain relief.  Benign essential hypertension, lets decrease his losartan but keep hydrochlorothiazide and amlodipine on board, because of his other medical conditions of kidney stones and Raynaud's phenomena.  Raynaud's phenomenon, probably reasonable to stay on the calcium channel blocker amlodipine for that reason.  History of dilation of the ascending aorta, was seen on CT scan in 2017, let us check this with a CT angio, no symptoms, and his cardiac auscultation is normal.  Hyperlipidemia, with no history of cardiovascular events, on high intensity atorvastatin 40 mg a day, with LDL cholesterol under 100 when checked March 2020.  Prostate cancer, status post radical prostatectomy in the late 1990s, undetectable PSAs.  Mild normocytic anemia has been observed since 2017, with hemoglobin that runs in the 13 g range.  Status post right total hip arthroplasty May 2019, then with heterotopic bone excision and psoas tendon release March 2020 at [place].  Nephrolithiasis, known to have stones residing in both kidneys, currently asymptomatic, microhematuria has been observed.  Glaucoma, on eyedrops.  Satisfactory  screening colonoscopy December 2017 with sigmoid diverticulosis, no polyps, recheck optional 5 years after that.  Erectile dysfunction, and low testosterone levels, started on topical testosterone in January 2021, also uses high-dose sildenafil.  Up-to-date on vaccinations although he could benefit from getting a Prevnar 13 pneumococcal, received a second shot of Pfizer COVID-19 vaccine January 25, 2021.     He does not need any laboratory test today.  Prescription issued for trazodone 50 mg at bedtime.  CT angio of the chest ordered     Going issue by issue:     Insomnia and anxiety, for which we will get a try trazodone as a substitute for Ambien, also the alprazolam on hand.  He travels a lot and keeps alprazolam on hand for that.  He does not when he used too much Ambien and I agree.  We will try trazodone, he might even split the 50 mg tablet in half and start with 25 mg at bedtime.  Trazodone is regarded as nonhabit-forming.  He told me that he does do some screen time in bed, and I suggested using blue blocker glasses or set the device to evening mode to reduce the white/blue light which suppresses melatonin levels.  Another thing he might consider is over-the-counter melatonin, probably 1 to 2 mg for starters, although some patients take up to 5 mg.  I reminded about the importance of restful evening routine, consider meditation, stretching exercises, etc.  He has a comfortable pillow to support his troublesome neck.     Status post lumbar fusion of L3-4 and decompression of L4-5 done at [facility] January 12, 2021 without complications, will be receiving core muscle strengthening soon.        Chronic cervical radiculopathy, with history of 4 neck procedures, fused from C4-C6, he will be getting an epidural injection on the left side March 22, and has muscle atrophy of his left trapezius, uses Celebrex for pain relief.  Ibuprofen has bothered his stomach so he staying away from that.  He has used  some naproxen as well but will probably offer Celebrex.     Benign essential hypertension, lets decrease his losartan but keep hydrochlorothiazide and amlodipine on board, because of his other medical conditions of kidney stones and Raynaud's phenomena. Today's in clinic blood pressure of 108/64 probably does not need to be driven down that low.  I will prescribe for him losartan 50 mg capsules,     Raynaud's phenomenon, probably reasonable to stay on the calcium channel blocker amlodipine for that reason.       History of dilation of the ascending aorta, was seen on CT scan in 2017, let us check this with a CT angio, no symptoms, and his cardiac auscultation is normal.  No symptoms to suggest aortic dissection, and I do not hear any aortic valve murmurs.     Hyperlipidemia, with no history of cardiovascular events, on high intensity atorvastatin 40 mg a day, with LDL cholesterol under 100 when checked March 2020.  He had a coronary calcification scan with a score less than 100.     Prostate cancer, status post radical prostatectomy in the late 1990s, undetectable PSAs.  His testosterone therapy is being supervised by urology.     Mild normocytic anemia has been observed since 2017, with hemoglobin that runs in the 13 g range.  Hemoglobin levels would need to be monitored while on testosterone, perhaps a mild increase   into the normal range.     Status post right total hip arthroplasty May 2019, then with heterotopic bone excision and psoas tendon release March 2020 at [place].       Nephrolithiasis, known to have stones residing in both kidneys, currently asymptomatic, microhematuria has been observed.  I reminded him about the importance of staying well-hydrated.  1 thing we might be giving up by dropping hydrochlorothiazide is that hydrochlorothiazide tends to decrease calcium excretion and is sometimes used to suppress formation of stones.  We decided to keep the hydrochlorothiazide going for that  reason.     Glaucoma, on eyedrops.       Satisfactory screening colonoscopy December 2017 with sigmoid diverticulosis, no polyps, recheck optional 5 years after that.       Erectile dysfunction, and low testosterone levels, started on topical testosterone in January 2021, also uses high-dose sildenafil.       Up-to-date on vaccinations although he could benefit from getting a Prevnar 13 pneumococcal, received a second shot of Pfizer COVID-19 vaccine January 25, 2021.

## 2021-06-17 NOTE — PROGRESS NOTES
Preoperative Exam    Scheduled Procedure: R Hip Arthroscopy and repair of torn labrum  Surgery Date:  04/13/18  Surgery Location: [facility]    Surgeon:  Dr Leonid Cardenas    Assessment/Plan:     1. Hip pain  Torn labrum with impingement.  To undergo repair by Dr. Cardenas  - HM1(CBC and Differential)  - Basic Metabolic Panel  - Electrocardiogram Perform - Clinic  - [facility] (CBC with Diff)    2. Chronic insomnia  With some at bedtime anxiety component.  0.25-0.5 Xanax nightly.  Consider Wellbutrin/BuSpar  - ALPRAZolam (XANAX) 0.5 MG tablet; TAKE 1 TABLET BY MOUTH AS NEEDED FOR SLEEP OR ANXIETY  Dispense: 90 tablet; Refill: 1    3. Erectile dysfunction  Med refill  - sildenafil (VIAGRA) 50 MG tablet; Take 1 tablet (50 mg total) by mouth as needed for erectile dysfunction.  Dispense: 30 tablet; Refill: 3    4. Dyspepsia  History of some erythema on recent endoscopy 12/2017 with a lot of Motrin usage.  He should try Celebrex rather than Motrin  - ranitidine (ZANTAC) 150 MG tablet; Take 1 tablet (150 mg total) by mouth 2 (two) times a day.  Dispense: 60 tablet; Refill: 1      5.  Hypertension-controlled    6.  History of anesthesia induced nausea.      Recommendations  1.  Medically cleared for surgery  2.  Medically cleared for general anesthesia  3.  Hold aspirin/antiplatelet agents preoperative.-1 week  4.  Prophylactic antibiotic per surgery  5.  Prophylactic aspirin postop for surgery-DVT prevention  6.  Continue ranitidine  7.  Regular at bedtime Xanax for sleep.  Consider alternatives-discussed        Dr. Cardenas thank you for this consultation.  If you have any questions or concerns please do not hesitate to contact me.    Derek Mccord MD  Internal medicine  [facility]  [phone]  [email or web address]    This provider spent greater than 40 min. face-to-face time with the patient and/or his family.  More than half this time was spent in  counseling and or coordination of care which was consistent with the nature of this patient's problems which are listed and described in the assessment and plan.    Surgical Procedure Risk: Intermediate (reported cardiac risk generally 1-5%)  Have you had prior anesthesia?: Yes  Have you or any family members had a previous anesthesia reaction:  No patient does have post op nausea-significant.  Do you or any family members have a history of a clotting or bleeding disorder?: No  Cardiac Risk Assessment: no increased risk for major cardiac complications    Patient approved for surgery with general or local anesthesia.     Functional Status: Independent  Patient plans to recover at home with family.     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  Patient is having right hip pain.  Evaluation is shown a large torn right hip labrum was some impingement.  Dr. Cardenas will be repairing this    Patient has had other surgeries in the past without difficulties-no history of infection or phlebitis.  He has had post anesthesia induced nausea and vomiting of a severe nature.    Medically he is well.  He has treated hypertension.  Hypertension-There are no cardiovascular, respiratory, neurologic complaints.No claudication.There is no orthostasis.Patient is compliant with medications.  Medications reviewed.   No side effects from medication.  He does take significant amount of ibuprofen.  He has had some gastric erythema on recent endoscopy.  He is on chronic ranitidine        All other systems reviewed and are negative, other than those listed in the HPI.  -Patient has chronic insomnia issues.  Some related to travel, caffeine, patient is chronically used intermittent Ambien.  No history of sleep apnea.  Xanax has been helpful.    Pertinent History  Do you have difficulty breathing or chest pain after walking up a flight of stairs: No  History of obstructive sleep apnea:  No  Steroid use in the last 6 months: Yes:    Frequent Aspirin/NSAID use: Yes:    Prior Blood Transfusion: No  Prior Blood Transfusion Reaction: No  If for some reason prior to, during or after the procedure, if it is medically indicated, would you be willing to have a blood transfusion?:  There is no transfusion refusal.    Current Outpatient Prescriptions   Medication Sig Dispense Refill     ALPRAZolam (XANAX) 0.5 MG tablet TAKE 1 TABLET BY MOUTH AS NEEDED FOR SLEEP OR ANXIETY 90 tablet 1     aspirin 81 MG EC tablet Take 81 mg by mouth daily.       atorvastatin (LIPITOR) 40 MG tablet Take 1 tablet (40 mg total) by mouth daily. 90 tablet 3     ibuprofen (ADVIL,MOTRIN) 200 MG tablet Take 600 mg by mouth every 6 (six) hours as needed.       losartan (COZAAR) 100 MG tablet Take 1 tablet (100 mg total) by mouth daily. 30 tablet 11     sildenafil (VIAGRA) 50 MG tablet Take 1 tablet (50 mg total) by mouth as needed for erectile dysfunction. 30 tablet 3     zolpidem (AMBIEN) 10 mg tablet Take 1 tablet (10 mg total) by mouth at bedtime as needed for sleep. 90 tablet 1     ranitidine (ZANTAC) 150 MG tablet Take 1 tablet (150 mg total) by mouth 2 (two) times a day. 60 tablet 1     No current facility-administered medications for this visit.         Allergies   Allergen Reactions     Demerol [Meperidine] Nausea Only     Morphine (Pf) Nausea Only     Nitrous Oxide Nausea Only       Patient Active Problem List   Diagnosis     Calculus of ureter     Calculus of kidney     Essential hypertension     Hypercholesterolemia     Thoracic ascending aortic aneurysm       Past Medical History:   Diagnosis Date     Agatston coronary artery calcium score less than 100 09/2017    score 90  2017     Cancer     cancer, 15 yrs ago      Gastric ulcer      High cholesterol      Hyperlipidemia      Hypertension      Kidney stone      PONV (postoperative nausea and vomiting)     nausea with Nitrous     Thoracic ascending aortic aneurysm 09/2017     "4.1 cm on ct       Past Surgical History:   Procedure Laterality Date     CERVICAL SPINE SURGERY       CYSTOSCOPY W/ URETERAL STENT PLACEMENT Bilateral 4/25/2016    Procedure: CYSTOSCOPY, URETEROSCOPIC LASER LITHOTRIPSY, STENT INSERTION;  Surgeon: Lonnie Rosenbaum MD;  Location: 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[employer]     Social History Main Topics     Smoking status: Never Smoker     Smokeless tobacco: Never Used      Comment: 2 cigars per month     Alcohol use Yes      Comment: occas     Drug use: No     Sexual activity: Not on file     Other Topics Concern     Not on file     Social History Narrative       Patient Care Team:  Derek Mccord MD as PCP - General (Internal Medicine)          Objective:     Vitals:    04/04/18 1418   BP: 124/70   Pulse: 79   SpO2: 98%   Height: 5' 8.75\" (1.746 m)     Wt Readings from Last 3 Encounters:   11/07/17 165 lb (74.8 kg)   09/27/17 166 lb (75.3 kg)   09/26/17 167 lb (75.8 kg)         Physical Exam:  Skin: Normal. No rash or lesion  Lymph Nodes: None palpable-including neck, axilla, inguinal, epitrochlear.  Head:  Normocephalic.    Eyes: Midline.  Equal size., full ROM.  External exams normal.  No icterus  Ears:  Normal pinnae, canals, and TM's.    Nose:  Patent, without deformity.    Throat:  Moist mucous membranes without lesions, erythema, or exudate.    Neck: No palpable masses, lymphadenopathy or tenderness.No thyromegaly or goiter.  No thyroid nodule.  Carotid Arteries:  No Bruit.  Carotid upstroke normal  Chest Wall: No deformity or pain elicited on compression.  Respiratory:  Normal respiratory effort.  Lungs are clear with good breath sounds.  No dullness.  No wheezing.  Heart: Regular " rhythm.  Normal sounding S1, S2 without S3, S4, murmurs, rubs, or gallops.    Abdomen:  The abdomen was flat, soft and nontender without guarding rebound or masses.  There are normal bowel sounds.  There is no hepatosplenomegaly.  There is no palpable enlargement of the aorta.  /rectal not performed  Extremities:  Full ROM without limitation, deformity or edema.    4+ pulses  Neurologic-intact- No focal deficit.  Speech clear.  Coordination normal.  Strength symmetric  Orthopedic-per Dr. Cardenas      There are no Patient Instructions on file for this visit.    EKG: Normal sinus rhythm normal ECG    Labs:  Recent Results (from the past 24 hour(s))   Electrocardiogram Perform - Clinic    Collection Time: 04/04/18  2:24 PM   Result Value Ref Range    SYSTOLIC BLOOD PRESSURE  mmHg    DIASTOLIC BLOOD PRESSURE  mmHg    VENTRICULAR RATE 73 BPM    ATRIAL RATE 73 BPM    P-R INTERVAL 164 ms    QRS DURATION 94 ms    Q-T INTERVAL 380 ms    QTC CALCULATION (BEZET) 418 ms    P Axis 39 degrees    R AXIS -5 degrees    T AXIS 56 degrees    MUSE DIAGNOSIS       Normal sinus rhythm  Incomplete right bundle branch block  Borderline ECG  When compared with ECG of 21-MAY-2017 14:42,  No significant change was found     HM1 (CBC with Diff)    Collection Time: 04/04/18  2:27 PM   Result Value Ref Range    WBC 10.4 4.0 - 11.0 thou/uL    RBC 4.12 (L) 4.40 - 6.20 mill/uL    Hemoglobin 12.8 (L) 14.0 - 18.0 g/dL    Hematocrit 38.2 (L) 40.0 - 54.0 %    MCV 93 80 - 100 fL    MCH 31.0 27.0 - 34.0 pg    MCHC 33.5 32.0 - 36.0 g/dL    RDW 12.2 11.0 - 14.5 %    Platelets 254 140 - 440 thou/uL    MPV 7.4 7.0 - 10.0 fL    Neutrophils % 76 (H) 50 - 70 %    Lymphocytes % 14 (L) 20 - 40 %    Monocytes % 9 2 - 10 %    Eosinophils % 1 0 - 6 %    Basophils % 1 0 - 2 %    Neutrophils Absolute 7.9 (H) 2.0 - 7.7 thou/uL    Lymphocytes Absolute 1.5 0.8 - 4.4 thou/uL    Monocytes Absolute 0.9 0.0 - 0.9 thou/uL    Eosinophils Absolute 0.1 0.0 - 0.4 thou/uL     Basophils Absolute 0.1 0.0 - 0.2 thou/uL     BMP-done-pending          Immunization History   Administered Date(s) Administered     Influenza, inj, historic,unspecified 09/26/2017           Electronically signed by Derek Mccord MD 04/04/18 2:15 PM

## 2021-06-20 NOTE — PROGRESS NOTES
Preoperative Exam    Scheduled Procedure: L subscapularis repair  Surgery Date:  09/10/18  Surgery Location: [facility] Fax [phone]    Surgeon:  Dr Tony Horan    Assessment/Plan:     1. Rotator cuff tear  To undergo surgical correction tomorrow Friday 1 PM.  Suitable candidate for anesthesia.  Antiplatelet agents have been held.  - HM1(CBC and Differential)  - HM1 (CBC with Diff)    2. Essential hypertension  Excellent control  - Renal Function Profile    3. Hypercholesterolemia/abnormal coronary calcium score with normal stress test  Excellent control-on chronic statin therapy  - Cholesterol, Total    4. Nephrolithiasis  Currently asymptomatic.          Recommendations  1.  Approved for surgery with general/local anesthesia  2.  Hold antiplatelet agents- off aspirin ×1 week.  Hold ibuprofen  3.  Prophylactic antibiotic per surgery  4.  The a.m. blood pressure medication      Dr. Horan thank you for this consultation.  If you have any questions or concerns please do not hesitate to contact me    Derek Mccord MD  Internal medicine  [facility]  [phone]  [email or web address]          Surgical Procedure Risk: Low (reported cardiac risk generally < 1%)  Have you had prior anesthesia?: Yes  Have you or any family members had a previous anesthesia reaction:  No  Do you or any family members have a history of a clotting or bleeding disorder?: No  Cardiac Risk Assessment: no increased risk for major cardiac complications    Patient approved for surgery with general or local anesthesia.        Functional Status: Independent  Patient plans to recover at home with family.     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  To undergo surgical correction of a infraspinatus complete rotator cuff tear of the left arm.  No pain.  Weakness.    Multiple orthopedic surgeries in the past including recent left hip  labral surgery.  Uncomplicated.    No postoperative issues such as phlebitis or infection  Anesthesia generally well tolerated except significant nausea with morphine.    Patient is in overall excellent health    Hypertension-There are no cardiovascular, respiratory, neurologic complaints.No claudication.There is no orthostasis.Patient is compliant with medications.  Medications reviewed.   No side effects from medication.    Hyperlipoproteinemia-patient is tolerating medication.  There are no myalgia, arthralgia, weakness.  No Bowel issues.  Liver profile has been normal.  Patient has met cholesterol goals.    Patient has a history of renal stones.  Recent cystoscopy unremarkable.  He has 3 stones.  Asymptomatic.    All other systems reviewed and are negative, other than those listed in the HPI.    Pertinent History  Do you have difficulty breathing or chest pain after walking up a flight of stairs: No  History of obstructive sleep apnea: No  Steroid use in the last 6 months: No  Frequent Aspirin/NSAID use: Yes: 81 mg aspirin and regular ibuprofen  Prior Blood Transfusion: No  Prior Blood Transfusion Reaction: No  If for some reason prior to, during or after the procedure, if it is medically indicated, would you be willing to have a blood transfusion?:  There is no transfusion refusal.    Current Outpatient Prescriptions   Medication Sig Dispense Refill     ALPRAZolam (XANAX) 0.5 MG tablet TAKE 1 TABLET BY MOUTH AS NEEDED FOR SLEEP OR ANXIETY 90 tablet 1     aspirin 81 MG EC tablet Take 81 mg by mouth daily.       atorvastatin (LIPITOR) 40 MG tablet Take 1 tablet (40 mg total) by mouth daily. 90 tablet 3     ibuprofen (ADVIL,MOTRIN) 200 MG tablet Take 600 mg by mouth every 6 (six) hours as needed.       losartan (COZAAR) 100 MG tablet Take 1 tablet (100 mg total) by mouth daily. 90 tablet 0     sildenafil, antihypertensive, (REVATIO) 20 mg tablet 2-5 tablets as needed for erectile dysfunction 100 tablet 5      zolpidem (AMBIEN) 10 mg tablet Take 1 tablet (10 mg total) by mouth at bedtime as needed for sleep. 90 tablet 1     ranitidine (ZANTAC) 150 MG tablet Take 1 tablet (150 mg total) by mouth 2 (two) times a day. 60 tablet 1     No current facility-administered medications for this visit.         Allergies   Allergen Reactions     Demerol [Meperidine] Nausea Only     Morphine (Pf) Nausea Only     Nitrous Oxide Nausea Only       Patient Active Problem List   Diagnosis     Calculus of ureter     Calculus of kidney     Essential hypertension     Hypercholesterolemia     Thoracic ascending aortic aneurysm (H)       Past Medical History:   Diagnosis Date     Agatston coronary artery calcium score less than 100 09/2017    score 90  2017     Cancer (H)     cancer, 15 yrs ago      Gastric ulcer      High cholesterol      Hyperlipidemia      Hypertension      Kidney stone      PONV (postoperative nausea and vomiting)     nausea with Nitrous     Thoracic ascending aortic aneurysm (H) 09/2017    4.1 cm on ct       Past Surgical History:   Procedure Laterality Date     CERVICAL SPINE SURGERY       CYSTOSCOPY W/ URETERAL STENT PLACEMENT Bilateral 4/25/2016    Procedure: CYSTOSCOPY, URETEROSCOPIC LASER LITHOTRIPSY, STENT INSERTION;  Surgeon: Lonnie Rosenbaum MD;  Location: [facility];  Service:      GA ANESTH,REPAIR UPPER ABD HERNIA NOS       SHOULDER SURGERY       URETEROSCOPY         Social History     Social History     Marital status:      Spouse name: N/A     Number of children: N/A     Years of education: N/A     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[employer]     Social History Main Topics     Smoking status: Never Smoker     Smokeless tobacco: Never Used      Comment: 2 cigars per month     Alcohol use Yes      Comment: occas     Drug use: No     Sexual activity: Not on file     Other Topics Concern     Not on file     Social History Narrative       Patient Care Team:  Derek Mccord MD as PCP -  "General (Internal Medicine)          Objective:     Vitals:    09/06/18 1136   BP: 136/80   Pulse: 73   SpO2: 99%   Weight: 163 lb 0.6 oz (74 kg)   Height: 5' 8.75\" (1.746 m)     Vitals:    09/06/18 1136 09/06/18 1149   BP: 136/80 124/82   Patient Site: Left Arm    Patient Position: Sitting    Cuff Size: Adult Regular    Pulse: 73    SpO2: 99%    Weight: 163 lb 0.6 oz (74 kg)    Height: 5' 8.75\" (1.746 m)          Physical Exam:  Skin: Normal. No rash or lesion  Lymph Nodes: None palpable-including neck, axilla, inguinal, epitrochlear.  Head:  Normocephalic.    Eyes: Midline.  Equal size.   External exams normal.  No icterus  Ears:  Normal pinnae, canals, and TM's.    Nose:  Patent, without deformity.    Throat:  Moist mucous membranes without lesions, erythema, or exudate.  Excellent dentition  Neck: No palpable masses, lymphadenopathy or tenderness.No thyromegaly or goiter.  No thyroid nodule.  Carotid Arteries:   Carotid upstroke normal  Chest Wall: No deformity or pain elicited on compression.  Respiratory:  Normal respiratory effort.  Lungs are clear with good breath sounds.  No dullness.  No wheezing.  Heart: Regular rhythm.  Normal sounding S1, S2 without S3, S4, murmurs, rubs, or gallops.    Abdomen:  The abdomen was flat, soft and nontender without guarding rebound or masses.  There are normal bowel sounds.  There is no hepatosplenomegaly.  There is no palpable enlargement of the aorta.  Extremities:      4+ pulses   Normal bilateral hand strength  Orthopedics exam-per Marcelina       There are no Patient Instructions on file for this visit.    PDD-fpysqawbwhey-8/2018.  Sinus rhythm   RSR' or QR pattern in V1 suggests right ventricular conduction delay   Otherwise normal ECG   When compared with ECG of 21-MAY-2017 14:42,   No significant change was found   Confirmed by VIVIANA DIAMOND MD LOC:LOCO ([id number]) on 4/5/2018 3:09:12 PM      Resulting Agency  [facility]      Specimen Collected: 04/04/18  2:24 PM Last " Resulted: 04/05/18  3:09 PM Lab Flowsheet Order Details View Encounter Lab and Collection Details Routing Result History         Linked Documents             Labs:          CBC, renal profile done today          Lab Results   Component Value Date    CREATININE 0.80 04/04/2018    BUN 18 04/04/2018     04/04/2018    K 3.9 04/04/2018     (H) 04/04/2018    CO2 24 04/04/2018           Lab Results   Component Value Date    CHOL 168 11/07/2017    CHOL 234 (H) 08/02/2017    CHOL 289 (H) 09/12/2016     Lab Results   Component Value Date    HDL 54 11/07/2017    HDL 65 08/02/2017    HDL 99 09/12/2016     Lab Results   Component Value Date    LDLCALC 98 11/07/2017    LDLCALC 148 (H) 08/02/2017    LDLCALC 169 (H) 09/12/2016     Lab Results   Component Value Date    TRIG 79 11/07/2017    TRIG 107 08/02/2017    TRIG 103 09/12/2016     No components found for: CHOLHDL          Immunization History   Administered Date(s) Administered     Influenza, inj, historic,unspecified 09/26/2017           Electronically signed by Derek Mccord MD 09/06/18 11:37 AM

## 2021-06-20 NOTE — PROGRESS NOTES
Notes Recorded by Richard Garcia MD on 9/27/2017 at 11:00 AM  I have called the patient and discussed his coronary calcium score study.  He has calcium score of 90 located in both proximal LAD and RCA. Recommend him to increase Lipitor to 40 mg daily, repeat Lipid profile and LFT in 2 months.  Also discussed mild ascending aortic dilation, 4.1 cm.  Repeat cardiac MRA in one year.  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

## 2021-06-21 NOTE — LETTER
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 Vaca,    The radiologist compared the aorta dimensions to a coronary calcium CT scan from 2017, a little over 3 years ago.  Since the aortic dimensions did not change, I think we can delay the recheck until March 2023, 2 years from now.  Your blood pressure is beautifully controlled.  I doubt that your aorta is going to become a problem.    Mild coronary calcifications are to be expected in a 75-year-old.    The the areas of bronchiectasis are probably nothing to worry about, as long as you are not having breathing problems.  Perhaps these are areas of old healed fungal infection.    Resulted Orders   CTA Chest    Narrative    EXAM: CTA CHEST  LOCATION: [facility]  DATE/TIME: 3/30/2021 2:53 PM    INDICATION: Thoracic aortic aneurysm (TAA), known  COMPARISON: Coronary CT 09/27/2017  TECHNIQUE: CT chest pulmonary angiogram during arterial phase injection of IV contrast. Multiplanar reformats and MIP reconstructions were performed. Dose reduction techniques were used.   CONTRAST: Iopamidol (Isovue-370) 75mL    FINDINGS:  ANGIOGRAM CHEST: Excellent opacification of the aorta as well as the pulmonary arteries and branches. Thoracic aorta is quite ectatic. Ascending aorta measures 3.9 cm in AP and transverse position, unchanged. No dissection. Minimal arterial   calcifications without any stenosis of the great vessel origins. No pulmonary emboli.     LUNGS AND PLEURA: Mild cylindrical bronchiectasis in the upper lobes and right middle lobe. No retained secretions or peribronchial thickening. No pulmonary nodules or effusions.    MEDIASTINUM/AXILLAE: No adenopathy. No thyroid nodules.    CORONARY ARTERY CALCIFICATION: Minimal.    UPPER  ABDOMEN: Normal.    MUSCULOSKELETAL: Lower anterior cervical spinal fusion apparatus. No suspicious lesions. Minimal gynecomastia.      Impression    1.  Stable 3.9 cm ascending aortic aneurysm. Thoracic aorta is ectatic. No great vessel stenosis.  2.  Mild upper lobe and right middle lobe cylindrical bronchiectasis.  3.  Minimal coronary artery calcifications           Please call with questions or contact us using [place].    Sincerely,        Electronically signed by Wil Larson MD

## 2021-06-25 NOTE — PROGRESS NOTES
Progress Notes by Derek Mccord MD at 11/6/2017 10:31 PM     Author: Derek Mccord MD Service: -- Author Type: Physician    Filed: 11/6/2017 10:49 PM Encounter Date: 11/6/2017 Status: Signed    : Derek Mccord MD (Physician)         Ascending aorta dimensions for men    Fifth and 95th percentiles based upon echocardiographic xuswosc-lrxh-tk-leading-edge diameters of the sinus of Valsalva and ascending aorta in 815 men with normal cardiac findings on transthoracic echocardiogram.  Reproduced from: Christine P, Mathieu F, Sharlene J, et al. Gender, age, and body surface area are the major determinants of ascending aorta dimensions in subjects with apparently normal echocardiograms. J Am Soc Echocardiogr 2009; 22:720. Illustration used with the permission of Elsevier Inc. All rights reserved.      ?A thoracic aortic aneurysm (TAA) is defined as a permanent localized dilation of the thoracic aorta having at least a 50 percent increase in diameter compared with the expected normal diameter for that aortic segment. Normal diameters for the thoracic aorta are given in the text above. Aneurysms of the thoracic aorta are classified by the segment of the aorta that is involved. (See 'Introduction' above.)      Surgery      ?Asymptomatic ascending TAA:   ?End-diastolic aortic diameter of 5 to 6 cm or aortic size index (aortic diameter [cm] divided by body surface area [m2]) ?2.75 cm/m2 [7]. A body surface area calculator can       ?Asymptomatic TAA with rapid expansion ?10 mm per year for aneurysms <5 cm in diameter [8]. For patients with associated genetically-mediated conditions, a lower expansion rate (>5 mm/year) is suggested as an indicator for repair [5].         Follow  Cta, mra      ?Sporadic (degenerative) aortic root or ascending aortic aneurysm  ?3.5 to 4.5 cm: annual CT or MR angiography, echocardiogram to follow valvular disease (if needed)  ?4.5 to 5.4 cm: biannual CT or MR angiography, echocardiogram  to follow valvular disease (if needed)

## 2021-06-25 NOTE — PROGRESS NOTES
Progress Notes by Derek Mccord MD at 11/6/2017 10:31 PM     Author: Derek Mccord MD Service: -- Author Type: Physician    Filed: 11/6/2017 10:49 PM Encounter Date: 11/6/2017 Status: Signed    : Derek Mccord MD (Physician)         Ascending aorta dimensions for men    Fifth and 95th percentiles based upon echocardiographic vsaebsq-ycva-ll-leading-edge diameters of the sinus of Valsalva and ascending aorta in 815 men with normal cardiac findings on transthoracic echocardiogram.  Reproduced from: Christine P, Mathieu F, Sharlene J, et al. Gender, age, and body surface area are the major determinants of ascending aorta dimensions in subjects with apparently normal echocardiograms. J Am Soc Echocardiogr 2009; 22:720. Illustration used with the permission of Elsevier Inc. All rights reserved.      ?A thoracic aortic aneurysm (TAA) is defined as a permanent localized dilation of the thoracic aorta having at least a 50 percent increase in diameter compared with the expected normal diameter for that aortic segment. Normal diameters for the thoracic aorta are given in the text above. Aneurysms of the thoracic aorta are classified by the segment of the aorta that is involved. (See 'Introduction' above.)      Surgery      ?Asymptomatic ascending TAA:   ?End-diastolic aortic diameter of 5 to 6 cm or aortic size index (aortic diameter [cm] divided by body surface area [m2]) ?2.75 cm/m2 [7]. A body surface area calculator can       ?Asymptomatic TAA with rapid expansion ?10 mm per year for aneurysms <5 cm in diameter [8]. For patients with associated genetically-mediated conditions, a lower expansion rate (>5 mm/year) is suggested as an indicator for repair [5].         Follow  Cta, mra      ?Sporadic (degenerative) aortic root or ascending aortic aneurysm  ?3.5 to 4.5 cm: annual CT or MR angiography, echocardiogram to follow valvular disease (if needed)  ?4.5 to 5.4 cm: biannual CT or MR angiography, echocardiogram  to follow valvular disease (if needed)

## 2021-06-25 NOTE — PROGRESS NOTES
Progress Notes by Richard Garcia MD at 9/26/2017 12:50 PM     Author: Richard Garcia MD Service: -- Author Type: Physician    Filed: 9/26/2017  1:34 PM Encounter Date: 9/26/2017 Status: Signed    : Richard Garcia MD (Physician)           Click to link to [facility] HEART CARE NOTE    Thank you, Dr. Mccord, for asking me to see Clayton Vaca MD in consultation at [facility] to evaluate mild dyspnea on exertion.      Assessment/Plan:   1.  Mild dyspnea on exertion: His mild dyspnea on exertion and mild leg edema in June in [place] could be caused by mild fluid overload secondary to salty diet (restaurants every meals).  Now his leg edema was resolved.  He can exercise without symptoms.  His nuclear stress test as mentioned was negative for inducible myocardial ischemia.  His heart function was normal.  Due to his history of hypercholesterolemia and hypertension, coronary calcium score is requested for screening pre-clinic coronary artery disease.    2.  Essential hypertension: His blood pressure has been controlled well with current medications.    3.  Hypercholesterolemia: Continue Lipitor 20 mg at night.  Depending on his coronary calcium score, and then decided if he needs to adjust the dosage of Lipitor.      Thank you Dr. Mccord for the opportunity to be involved in the care of Clayton Vcaa MD. If you have any questions, please feel free to contact me.  I will see the patient again as needed.    Much or all of the text in this note was generated through the use of Dragon Dictate voice-to-text software. Errors in spelling or words which seem out of context are unintentional.   Sound alike errors, in particular, may have escaped editing.       History of Present Illness:   It is my pleasure to see Clayton Vaca MD at the [facility] for evaluation of Consult. 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  Dr. 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  He had no chest pain, palpitations, dizziness, orthopnea PND.  His leg swelling was resolved after he come back.  He has occasional mild dyspnea on exertion.  He can do his routine activities and exercise without symptoms.  His blood pressure and heart rate are controlled well.    He had normal ECG in June.  He also had exercise stress nuclear study in June.  He exercised 12 minutes.  His exercise stress ECG and exercise stress nuclear study were negative for inducible myocardial ischemia.    Past Medical History:     Patient Active Problem List   Diagnosis   ? Calculus of ureter   ? Calculus of kidney   ? Essential hypertension   ? Hypercholesterolemia       Past Surgical History:     Past Surgical History:   Procedure Laterality Date   ? CERVICAL SPINE SURGERY     ? CYSTOSCOPY W/ URETERAL STENT PLACEMENT Bilateral 4/25/2016    Procedure: CYSTOSCOPY, URETEROSCOPIC LASER LITHOTRIPSY, STENT INSERTION;  Surgeon: Lonnie Rosenbaum MD;  Location: [facility];  Service:    ? [place] ANESTH,REPAIR UPPER ABD HERNIA NOS     ? SHOULDER SURGERY     ? URETEROSCOPY         Family History:     Family History   Problem Relation Age of Onset   ? Stroke Mother    ? Heart attack Maternal Uncle    ? Heart disease Maternal Uncle    ? Urolithiasis Neg Hx        Social History:    reports that he has never smoked. He does not have any smokeless tobacco history on file. He reports that he drinks alcohol. He reports that he does not use illicit drugs.    Review of Systems:   General: WNL  Eyes: WNL  Ears/Nose/Throat: WNL  Lungs: WNL  Heart: WNL  Stomach: WNL  Bladder: WNL  Muscle/Joints: WNL  Skin: WNL  Nervous System: WNL  Mental Health: WNL     Blood: WNL    Meds:     Current Outpatient Prescriptions:   ?  ALPRAZolam (XANAX) 0.5 MG tablet, TAKE 1 TABLET BY MOUTH AS NEEDED FOR SLEEP OR  "ANXIETY, Disp: 30 tablet, Rfl: 0  ?  aspirin 81 MG EC tablet, Take 81 mg by mouth daily., Disp: , Rfl:   ?  atorvastatin (LIPITOR) 20 MG tablet, Take 1 tablet (20 mg total) by mouth daily., Disp: 90 tablet, Rfl: 3  ?  losartan (COZAAR) 100 MG tablet, Take 1 tablet (100 mg total) by mouth daily., Disp: 30 tablet, Rfl: 11  ?  sildenafil (VIAGRA) 50 MG tablet, Take 1 tablet (50 mg total) by mouth as needed for erectile dysfunction., Disp: 30 tablet, Rfl: 3  ?  zolpidem (AMBIEN) 10 mg tablet, TAKE 1 TABLET(10 MG) BY MOUTH BEDTIME AS NEEDED FOR SLEEP, Disp: 30 tablet, Rfl: 1  ?  lisinopril (PRINIVIL,ZESTRIL) 10 MG tablet, Take 10 mg by mouth daily., Disp: , Rfl:   ?  zolpidem (AMBIEN) 10 mg tablet, Take 1 tablet (10 mg total) by mouth bedtime as needed for sleep., Disp: 90 tablet, Rfl: 1     Allergies:   Demerol [meperidine] and Morphine (pf)    Objective:      Physical Exam  167 lb (75.8 kg)  5' 9\" (1.753 m)  Body mass index is 24.66 kg/(m^2).  /72 (Patient Site: Right Arm, Patient Position: Sitting, Cuff Size: Adult Regular)  Pulse 64  Resp 18  Ht 5' 9\" (1.753 m)  Wt 167 lb (75.8 kg)  BMI 24.66 kg/m2    General Appearance:   Awake, Alert, No acute distress.   HEENT:  Pupil equal, reactive to light. No scleral icterus; the mucous membranes were moist. No oral ulcers or thrush.    Neck: No cervical bruits. No JVD. No thyromegaly. No lymph node enlargement or tenderness.   Chest: The spine was straight. The chest was symmetric.   Lungs:   Respirations unlabored. Lungs are clear to auscultation. No crackles. No wheezing.   Cardiovascular:   RRR, normal first and second heart sounds with no murmurs. No rubs or gallops.    Abdomen:  Soft. No tenderness. Non-distended. Bowels sounds are present   Extremities: Equal posterior tibial pulses. No leg edema.   Skin: No rashes or ulcers. Warm, Dry.   Musculoskeletal: No tenderness. No deformity.   Neurologic: Mood and affect are appropriate. No focal deficits.     "     EKG:  Personally reivewed  Normal sinus rhythm   Normal ECG   When compared with ECG of 23-SEP-2008 14:43,   No significant change was found     Cardiac Imaging Studies  Nuclear stress test on 6-:    The images of 11/13/2007 were unavailable for comparison review.    Exercise stress ECG is negative for inducible myocardial ischemia.    The patient achieved a good exercise workload with after inducible angina.    Exercises stress nuclear study is negative for inducible myocardial ischemia or infarction.    Normal left ventricular size, wall motion and function. The calculated left ventricular ejection fraction is 69%.    Lab Review   Lab Results   Component Value Date     05/21/2017    K 4.1 05/21/2017     (H) 05/21/2017    CO2 23 05/21/2017    BUN 23 (H) 05/21/2017    CREATININE 0.87 05/21/2017    CALCIUM 9.0 05/21/2017     Lab Results   Component Value Date    WBC 8.4 05/21/2017    HGB 14.1 05/21/2017    HCT 40.7 05/21/2017    MCV 89 05/21/2017     05/21/2017     Lab Results   Component Value Date    CHOL 234 (H) 08/02/2017    TRIG 107 08/02/2017    HDL 65 08/02/2017     Lab Results   Component Value Date    BNP 17 05/21/2017

## 2021-06-25 NOTE — PROGRESS NOTES
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    Surgical Information:  Surgery/Procedure: Right Phaco w/ IOL  Surgery Location: [facility]  Surgeon: Dr. Carlson  Surgery Date: 6/15/21  Time of Surgery: Unknown  Where patient plans to recover: At home with family and At home alone  Fax number for surgical facility: [phone]    Type of Anesthesia Anticipated: Local with MAC    Assessment & Plan      The proposed surgical procedure is considered LOW risk.    Satisfactory preoperative medical exam in anticipation of right-sided cataract surgery scheduled for Carolyn 15, 2021 to be done by Dr. Carlson at [facility].    I suggested that he check with [facility] to see if he needs a Covid test.    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    His back has been aching a bit more, so we will get a lumbar spine x-ray to check the status of his fusions.    Prescriptions renewed today for his amlodipine, hydrochlorothiazide, and Celebrex.    With regards to medications the morning of his cataract procedure, I asked him to skip the morning hydrochlorothiazide, since he will probably be fasting.  He can take his other morning medications with a small sip of water, including his amlodipine, losartan, and Lipitor.    RECOMMENDATION:  APPROVAL GIVEN to proceed with proposed procedure, without further diagnostic evaluation.          Subjective     HPI related to upcoming procedure:     Surgery/Procedure: Right Phaco w/ IOL  Surgery Location: [facility]  Surgeon: Dr. 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  He does not when he used too much Ambien and I agree.       Status post lumbar fusion of L3-4 and decompression of L4-5 done at [facility] January 12, 2021 without complications     Chronic cervical radiculopathy, with history of 4 neck procedures, fused from C4-C6, he will be getting an epidural injection on the left side March 22, and has muscle atrophy of his left trapezius, uses Celebrex for pain relief.  Ibuprofen has bothered his stomach so he staying away from that.  He has used some naproxen as well but will probably offer Celebrex.     Benign essential hypertension, on losartan hydrochlorothiazide and amlodipine on board, because of his other medical conditions of kidney stones and Raynaud's phenomena.   BP Readings from Last 3 Encounters:   06/03/21 104/70   03/18/21 108/64   12/29/20 122/70     Raynaud's phenomenon, probably reasonable to stay on the calcium channel blocker amlodipine for that reason.      History of dilation of the ascending aorta, stable appearance on CT angiogram March 30, 2021, no symptoms, and his cardiac auscultation is normal.  No symptoms to suggest aortic dissection, and I do not hear any aortic valve murmurs.    3- CTA  1.  Stable 3.9 cm ascending aortic aneurysm. Thoracic aorta is ectatic. No great vessel stenosis.  2.  Mild upper lobe and right middle lobe cylindrical bronchiectasis.  3.  Minimal coronary artery calcifications    Hyperlipidemia, with no history of cardiovascular events, on high intensity atorvastatin 40 mg a day, with LDL cholesterol under 100 when checked March 2020.  He had a coronary calcification scan with a score less than 100.     Prostate cancer, status post radical prostatectomy in the late 1990s, undetectable PSAs.  His testosterone therapy is being supervised by urology.     Mild  normocytic anemia has been observed since 2017, with hemoglobin that runs in the 13 g range.  Hemoglobin levels would need to be monitored while on testosterone, perhaps a mild increase   into the normal range.  Lab Results   Component Value Date    HGB 13.8 (L) 12/29/2020     Status post right total hip arthroplasty May 2019, then with heterotopic bone excision and psoas tendon release March 2020 at [place].       Nephrolithiasis, known to have stones residing in both kidneys, currently asymptomatic, microhematuria has been observed.  I reminded him about the importance of staying well-hydrated.  1 thing we might be giving up by dropping hydrochlorothiazide is that hydrochlorothiazide tends to decrease calcium excretion and is sometimes used to suppress formation of stones.  We decided to keep the hydrochlorothiazide going for that reason.  Lab Results   Component Value Date    CREATININE 1.11 12/29/2020    BUN 29 (H) 12/29/2020     12/29/2020    K 4.2 12/29/2020     12/29/2020    CO2 24 12/29/2020      Glaucoma, on eyedrops.       Satisfactory screening colonoscopy December 2017 with sigmoid diverticulosis, no polyps, recheck optional 5 years after that = 2022      Erectile dysfunction, and low testosterone levels, started on topical testosterone in January 2021, also uses high-dose sildenafil.       Up-to-date on vaccinations although he could benefit from getting a Prevnar 13 pneumococcal, received a second shot of Pfizer COVID-19 vaccine January 25, 2021.       Preop Questions 6/3/2021   Have you ever had a heart attack or stroke? No   Have you ever had surgery on your heart or blood vessels, such as a stent placement, a coronary artery bypass, or surgery on an artery in your head, neck, heart, or legs? No   Do you have chest pain with activity? No   Do you have a history of  heart failure? No   Do you currently have a cold, bronchitis or symptoms of other infection? No   Do you have a cough, shortness  of breath, or wheezing? No   Do you or anyone in your family have previous history of blood clots? No   Do you or does anyone in your family have a serious bleeding problem such as prolonged bleeding following surgeries or cuts? No   Have you ever had problems with anemia or been told to take iron pills? No   Have you had any abnormal blood loss such as black, tarry or bloody stools? No   Have you ever had a blood transfusion? No   Are you willing to have a blood transfusion if it is medically needed before, during, or after your surgery? Yes   Have you or any of your relatives ever had problems with anesthesia? No   Do you have sleep apnea, excessive snoring or daytime drowsiness? UNKNOWN - snores, no FIDEL   Do you have any artifical heart valves or other implanted medical devices like a pacemaker, defibrillator, or continuous glucose monitor? No   Do you have artificial joints? YES - Status post right total hip arthroplasty May 2019   Are you allergic to latex? No       Review of Systems  CONSTITUTIONAL: NEGATIVE for fever, chills, change in weight  ENT/MOUTH: Negative for ear, mouth, and throat problems  RESP: NEGATIVE for significant cough or SOB  CV: NEGATIVE for chest pain, palpitations or peripheral edema    Patient Active Problem List    Diagnosis Date Noted     Anxiety 03/18/2021     Primary insomnia 03/18/2021     S/P lumbar fusion 03/18/2021     S/P cervical spinal fusion 03/18/2021     Cervical radiculopathy 03/18/2021     Raynaud phenomenon 03/18/2021     Normochromic anemia 03/18/2021     Other male erectile dysfunction 03/18/2021     Essential hypertension 09/26/2017     Hypercholesterolemia 09/26/2017     Thoracic ascending aortic aneurysm (H) 09/01/2017     Calculus of ureter      Calculus of kidney      Past Medical History:   Diagnosis Date     Agatston coronary artery calcium score less than 100 09/2017    score 90  2017     Cancer (H)     cancer, 15 yrs ago      Gastric ulcer      High  cholesterol      Hyperlipidemia      Hypertension      Kidney stone      PONV (postoperative nausea and vomiting)     nausea with Nitrous     Thoracic ascending aortic aneurysm (H) 09/2017    4.1 cm on ct     Past Surgical History:   Procedure Laterality Date     CERVICAL SPINE SURGERY      4 neck     CYSTOSCOPY W/ URETERAL STENT PLACEMENT Bilateral 4/25/2016    Procedure: CYSTOSCOPY, URETEROSCOPIC LASER LITHOTRIPSY, STENT INSERTION;  Surgeon: Lonnie Rosenbaum MD;  Location: [facility];  Service:      INGUINAL HERNIA REPAIR Bilateral      SHOULDER SURGERY      2 right, one left     URETEROSCOPY       Current Outpatient Medications   Medication Sig Dispense Refill     ALPRAZolam (XANAX) 0.5 MG tablet TAKE 1 TABLET BY MOUTH AS NEEDED FOR SLEEP OR ANXIETY 90 tablet 1     amLODIPine (NORVASC) 5 MG tablet Take 1 tablet (5 mg total) by mouth daily. 30 tablet 11     aspirin 81 MG EC tablet Take 81 mg by mouth daily.       celecoxib (CELEBREX) 200 MG capsule Take 1 capsule (200 mg total) by mouth daily. 90 capsule 2     cyanocobalamin 1000 MCG tablet Take 1,000 mcg by mouth.       latanoprost (XALATAN) 0.005 % ophthalmic solution Apply 1 drop to eye.       losartan (COZAAR) 50 MG tablet Take 1 tablet (50 mg total) by mouth daily. 90 tablet 3     sildenafil (VIAGRA) 100 MG tablet Take 1 tablet (100 mg total) by mouth as needed for erectile dysfunction. 20 tablet 1     testosterone (ANDROGEL PUMP) 20.25 mg/1.25 gram (1.62 %) GlPm gel Place 2 Pump on the skin.       timoloL maleate (TIMOPTIC) 0.5 % ophthalmic solution INSTILL BY OPHTHALMIC ROUTE EVERY MORNING INTO BOTH EYES.       zolpidem (AMBIEN) 10 mg tablet TAKE 1 TABLET EVERY NIGHT AT BEDTIME AS NEEDED FOR SLEEP 90 tablet 1     atorvastatin (LIPITOR) 40 MG tablet Take 1 tablet (40 mg total) by mouth daily. 90 tablet 3     hydroCHLOROthiazide (HYDRODIURIL) 12.5 MG tablet Take 1 tablet (12.5 mg total) by mouth daily. 30 tablet 2     No current  facility-administered medications for this visit.        Allergies   Allergen Reactions     Demerol [Meperidine] Nausea Only     Morphine (Pf) Nausea Only     Nitrous Oxide Nausea Only       Social History     Tobacco Use     Smoking status: Never Smoker     Smokeless tobacco: Never Used     Tobacco comment: 2 cigars per month   Substance Use Topics     Alcohol use: Yes     Comment: occas        Social History     Substance and Sexual Activity   Drug Use No        Objective     /70 (Patient Site: Right Arm, Patient Position: Sitting, Cuff Size: Adult Large)   Pulse 81   Temp 98.2  F (36.8  C) (Oral)   Wt 165 lb 1.6 oz (74.9 kg)   SpO2 97%   BMI 24.56 kg/m    Physical Exam    General: Alert, in no distress  Skin: No significant lesion seen.  Eyes/nose/throat: Eyes without scleral icterus, eye movements normal, pupils equal and reactive, oropharynx clear  MSK: Neck with good ROM  Lymphatic: Neck without adenopathy or masses  Pulm: Lungs clear to auscultation bilaterally  Cardiac: Heart with regular rate and rhythm, no murmur or gallop  GI: Abdomen soft, nontender  MSK: Extremities no tenderness or edema  Neuro: Moves all extremities, without focal weakness  Psych: Alert, normal mental status. Normal affect and speech    Recent Labs   Lab Test 12/29/20  1643 07/14/20  0728   HGB 13.8*  --      --     143   K 4.2 4.1   CREATININE 1.11 1.01      PRE-OP Diagnostics:       REVISED CARDIAC RISK INDEX (RCRI)   The patient has the following serious cardiovascular risks for perioperative complications:   - No serious cardiac risks = 0 points    RCRI INTERPRETATION: 0 points: Class I (very low risk - 0.4% complication rate)      Signed Electronically by: Wil Larson MD    Copy of this evaluation report is provided to requesting physician.

## 2021-06-26 NOTE — PATIENT INSTRUCTIONS - HE
Satisfactory preoperative medical exam in anticipation of right-sided cataract surgery scheduled for Carolyn 15, 2021 to be done by Dr. Carlson at [facility].    I suggested that he check with [facility] to see if he needs a Covid test.    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    His back has been aching a bit more, so we will get a lumbar spine x-ray to check the status of his fusions.    Prescriptions renewed today for his amlodipine, hydrochlorothiazide, and Celebrex.    With regards to medications the morning of his cataract procedure, I asked him to skip the morning hydrochlorothiazide, since he will probably be fasting.  He can take his other morning medications with a small sip of water, including his amlodipine, losartan, and Lipitor.        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  He does not when he used too much Ambien and I agree.       Status post lumbar fusion of L3-4 and decompression of L4-5 done at [facility] January 12, 2021 without complications     Chronic cervical radiculopathy, with history of 4 neck procedures, fused from C4-C6, he will be getting an epidural injection on the left side March 22, and has muscle atrophy of his left trapezius, uses Celebrex for pain relief.  Ibuprofen has bothered his stomach so he staying away from that.  He has used some naproxen as well but will probably offer Celebrex.     Benign essential hypertension, lets decrease his losartan but keep hydrochlorothiazide and amlodipine on board, because of his other medical conditions of kidney stones and Raynaud's phenomena.   BP Readings from Last 3 Encounters:   06/03/21 104/70   03/18/21 108/64   12/29/20 122/70       Raynaud's phenomenon, probably reasonable to stay on the calcium  channel blocker amlodipine for that reason.       History of dilation of the ascending aorta, stable appearance on CT angiogram March 30, 2021, no symptoms, and his cardiac auscultation is normal.  No symptoms to suggest aortic dissection, and I do not hear any aortic valve murmurs.    3- CTA  1.  Stable 3.9 cm ascending aortic aneurysm. Thoracic aorta is ectatic. No great vessel stenosis.  2.  Mild upper lobe and right middle lobe cylindrical bronchiectasis.  3.  Minimal coronary artery calcifications    Hyperlipidemia, with no history of cardiovascular events, on high intensity atorvastatin 40 mg a day, with LDL cholesterol under 100 when checked March 2020.  He had a coronary calcification scan with a score less than 100.     Prostate cancer, status post radical prostatectomy in the late 1990s, undetectable PSAs.  His testosterone therapy is being supervised by urology.     Mild normocytic anemia has been observed since 2017, with hemoglobin that runs in the 13 g range.  Hemoglobin levels would need to be monitored while on testosterone, perhaps a mild increase   into the normal range.  Lab Results   Component Value Date    HGB 13.8 (L) 12/29/2020        Status post right total hip arthroplasty May 2019, then with heterotopic bone excision and psoas tendon release March 2020 at [place].       Nephrolithiasis, known to have stones residing in both kidneys, currently asymptomatic, microhematuria has been observed.  I reminded him about the importance of staying well-hydrated.  1 thing we might be giving up by dropping hydrochlorothiazide is that hydrochlorothiazide tends to decrease calcium excretion and is sometimes used to suppress formation of stones.  We decided to keep the hydrochlorothiazide going for that reason.  Lab Results   Component Value Date    CREATININE 1.11 12/29/2020    BUN 29 (H) 12/29/2020     12/29/2020    K 4.2 12/29/2020     12/29/2020    CO2 24 12/29/2020      Glaucoma, on  eyedrops.       Satisfactory screening colonoscopy December 2017 with sigmoid diverticulosis, no polyps, recheck optional 5 years after that= 2022      Erectile dysfunction, and low testosterone levels, started on topical testosterone in January 2021, also uses high-dose sildenafil.       Up-to-date on vaccinations although he could benefit from getting a Prevnar 13 pneumococcal, received a second shot of Pfizer COVID-19 vaccine January 25, 2021.

## 2021-07-03 NOTE — ADDENDUM NOTE
Addendum Note by Renee Theodore MLT at 11/8/2017  9:53 AM     Author: Renee Theodore MLT Service: -- Author Type:     Filed: 11/8/2017  9:53 AM Encounter Date: 11/7/2017 Status: Signed    : Renee Theodore MLT ()    Addended by: RENEE THEODORE on: 11/8/2017 09:53 AM        Modules accepted: Orders

## 2021-07-04 NOTE — LETTER
Letter by Wil Larson MD at      Author: Wil Larson MD Service: -- Author Type: --    Filed:  Encounter Date: 6/4/2021 Status: (Other)         Clayton Vaca MD  [address] [facility] [address]             June 4, 2021         Dear Dr. Vaca,    Hopefully our radiology department was able to provide you with the images on a disc.    Resulted Orders   XR Lumbar Spine 2 or 3 VWS    Narrative    EXAM: XR LUMBAR SPINE 2 OR 3 VWS  LOCATION: [facility]  DATE/TIME: 6/3/2021 8:55 AM    INDICATION: s/p fusion, chronic low back pain  COMPARISON: 01/13/2021  TECHNIQUE: CR Lumbar Spine.      Impression    There are 5 nonrib-bearing lumbar type vertebral bodies normal vertebral body heights. Postoperative changes of posterior instrumented fusion and interbody spacer placement at L3-L4. Hardware is stable in appearance and alignment, without   evidence for fracture or loosening.    Mild broad thoracolumbar dextrocurvature, apex at L1-L2 measuring approximately 10 degrees from the superior T12 endplate to the inferior L4 endplate. 2 mm grade 1 anterolisthesis of L3 on L4 with otherwise unremarkable lateral alignment.    Mild to moderate interbody degenerative change at the nonfused levels, greatest at L2-L3, with disc space height loss and marginal osteophyte formation. Mild to moderate degenerative facet arthropathy at L4-L5 and L5-S1. No high-grade osseous   neuroforaminal stenosis on either oblique view. Mild osseous neuroforaminal stenosis on the left at L5-S1. Normal appearance of the sacroiliac joints. Multiple surgical clips in the pelvis. Partially visualized right total hip arthroplasty.           Please call with questions or contact us using [employer].    Sincerely,        Electronically signed by Wil Larson MD

## 2021-07-06 VITALS
TEMPERATURE: 98.2 F | OXYGEN SATURATION: 97 % | SYSTOLIC BLOOD PRESSURE: 104 MMHG | WEIGHT: 165.1 LBS | DIASTOLIC BLOOD PRESSURE: 70 MMHG | BODY MASS INDEX: 24.56 KG/M2 | HEART RATE: 81 BPM

## 2021-08-16 DIAGNOSIS — Z20.822 ENCOUNTER FOR LABORATORY TESTING FOR COVID-19 VIRUS: Primary | ICD-10-CM

## 2021-08-16 NOTE — PROGRESS NOTES
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

## 2021-09-11 ENCOUNTER — HEALTH MAINTENANCE LETTER (OUTPATIENT)
Age: 76
End: 2021-09-11

## 2021-11-10 ENCOUNTER — TRANSFERRED RECORDS (OUTPATIENT)
Dept: HEALTH INFORMATION MANAGEMENT | Facility: CLINIC | Age: 76
End: 2021-11-10

## 2021-12-03 ENCOUNTER — TELEPHONE (OUTPATIENT)
Dept: INTERNAL MEDICINE | Facility: CLINIC | Age: 76
End: 2021-12-03

## 2021-12-03 NOTE — TELEPHONE ENCOUNTER
Spoke to Dr. 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  I concurred with plan.

## 2021-12-03 NOTE — TELEPHONE ENCOUNTER
12-3-21  Dr Chema Parham called from [facility] and wanted dr mtz to give him a call regarding this pt.  Dr Parham didn't elaborate anymore  elisabeth

## 2022-01-01 ENCOUNTER — HEALTH MAINTENANCE LETTER (OUTPATIENT)
Age: 77
End: 2022-01-01

## 2022-02-16 DIAGNOSIS — I10 ESSENTIAL HYPERTENSION: ICD-10-CM

## 2022-02-18 RX ORDER — LOSARTAN POTASSIUM 50 MG/1
TABLET ORAL
Qty: 90 TABLET | Refills: 2 | Status: SHIPPED | OUTPATIENT
Start: 2022-02-18 | End: 2022-06-02

## 2022-02-18 NOTE — TELEPHONE ENCOUNTER
"Routing refill request to provider for review/approval because:  Labs not current:  potassium    Last Written Prescription Date:  3/18/2021  Last Fill Quantity: 90,  # refills: 3   Last office visit provider:  6/3/2021 Dr. Larson       losartan (COZAAR) 50 MG tablet 90 tablet 3 3/18/2021  --   Sig - Route: Take 1 tablet (50 mg total) by mouth daily. - Oral   Sent to pharmacy as: losartan 50 mg tablet (COZAAR)   Notes to Pharmacy: Dose decrease   E-Prescribing Status: Receipt confirmed by pharmacy (3/18/2021  2:18 PM CDT         Requested Prescriptions   Pending Prescriptions Disp Refills     losartan (COZAAR) 50 MG tablet [Pharmacy Med Name: *LOSARTAN POT 50MG] 90 tablet 2     Sig: TAKE 1 TABLET (50 MG TOTAL) BY MOUTH DAILY.       Angiotensin-II Receptors Failed - 2/16/2022 12:03 PM        Failed - Normal serum potassium on file in past 12 months     Recent Labs   Lab Test 12/29/20  1643   POTASSIUM 4.2                    Passed - Last blood pressure under 140/90 in past 12 months     BP Readings from Last 3 Encounters:   06/03/21 104/70   03/18/21 108/64   12/29/20 122/70                 Passed - Recent (12 mo) or future (30 days) visit within the authorizing provider's specialty     Patient has had an office visit with the authorizing provider or a provider within the authorizing providers department within the previous 12 mos or has a future within next 30 days. See \"Patient Info\" tab in inbasket, or \"Choose Columns\" in Meds & Orders section of the refill encounter.              Passed - Medication is active on med list        Passed - Patient is age 18 or older        Passed - Normal serum creatinine on file in past 12 months     Recent Labs   Lab Test 03/30/21  1435   CR 1.2       Ok to refill medication if creatinine is low               Belen Patel RN 02/18/22 9:53 AM  "

## 2022-04-13 ENCOUNTER — TRANSFERRED RECORDS (OUTPATIENT)
Dept: HEALTH INFORMATION MANAGEMENT | Facility: CLINIC | Age: 77
End: 2022-04-13
Payer: COMMERCIAL

## 2022-04-19 ENCOUNTER — LAB REQUISITION (OUTPATIENT)
Dept: LAB | Facility: CLINIC | Age: 77
End: 2022-04-19
Payer: COMMERCIAL

## 2022-04-19 DIAGNOSIS — I10 ESSENTIAL (PRIMARY) HYPERTENSION: ICD-10-CM

## 2022-04-19 DIAGNOSIS — E78.5 HYPERLIPIDEMIA, UNSPECIFIED: ICD-10-CM

## 2022-04-19 DIAGNOSIS — Z01.812 ENCOUNTER FOR PREPROCEDURAL LABORATORY EXAMINATION: ICD-10-CM

## 2022-04-19 LAB
ANION GAP SERPL CALCULATED.3IONS-SCNC: 7 MMOL/L (ref 5–18)
BUN SERPL-MCNC: 26 MG/DL (ref 8–28)
CALCIUM SERPL-MCNC: 9.3 MG/DL (ref 8.5–10.5)
CHLORIDE BLD-SCNC: 106 MMOL/L (ref 98–107)
CHOLEST SERPL-MCNC: 192 MG/DL
CO2 SERPL-SCNC: 28 MMOL/L (ref 22–31)
CREAT SERPL-MCNC: 1.02 MG/DL (ref 0.7–1.3)
FASTING STATUS PATIENT QL REPORTED: ABNORMAL
GFR SERPL CREATININE-BSD FRML MDRD: 77 ML/MIN/1.73M2
GLUCOSE BLD-MCNC: 99 MG/DL (ref 70–125)
HDLC SERPL-MCNC: 65 MG/DL
LDLC SERPL CALC-MCNC: 97 MG/DL
POTASSIUM BLD-SCNC: 4.2 MMOL/L (ref 3.5–5)
SODIUM SERPL-SCNC: 141 MMOL/L (ref 136–145)
TRIGL SERPL-MCNC: 151 MG/DL

## 2022-04-19 PROCEDURE — 80048 BASIC METABOLIC PNL TOTAL CA: CPT | Mod: ORL | Performed by: FAMILY MEDICINE

## 2022-04-19 PROCEDURE — 80061 LIPID PANEL: CPT | Mod: ORL | Performed by: FAMILY MEDICINE

## 2022-04-19 PROCEDURE — U0003 INFECTIOUS AGENT DETECTION BY NUCLEIC ACID (DNA OR RNA); SEVERE ACUTE RESPIRATORY SYNDROME CORONAVIRUS 2 (SARS-COV-2) (CORONAVIRUS DISEASE [COVID-19]), AMPLIFIED PROBE TECHNIQUE, MAKING USE OF HIGH THROUGHPUT TECHNOLOGIES AS DESCRIBED BY CMS-2020-01-R: HCPCS | Mod: ORL | Performed by: FAMILY MEDICINE

## 2022-04-20 LAB — SARS-COV-2 RNA RESP QL NAA+PROBE: NEGATIVE

## 2022-05-30 ASSESSMENT — ENCOUNTER SYMPTOMS
SORE THROAT: 0
HEMATURIA: 0
CONSTIPATION: 0
HEADACHES: 0
CHILLS: 0
PARESTHESIAS: 0
JOINT SWELLING: 0
NERVOUS/ANXIOUS: 1
HEARTBURN: 0
DYSURIA: 0
FREQUENCY: 1
HEMATOCHEZIA: 0
WEAKNESS: 1
ARTHRALGIAS: 0
SHORTNESS OF BREATH: 0
ABDOMINAL PAIN: 0
COUGH: 0
FEVER: 0
DIARRHEA: 0
NAUSEA: 0
PALPITATIONS: 0
DIZZINESS: 1
MYALGIAS: 1
EYE PAIN: 0

## 2022-05-30 ASSESSMENT — ACTIVITIES OF DAILY LIVING (ADL): CURRENT_FUNCTION: NO ASSISTANCE NEEDED

## 2022-06-02 ENCOUNTER — OFFICE VISIT (OUTPATIENT)
Dept: INTERNAL MEDICINE | Facility: CLINIC | Age: 77
End: 2022-06-02
Payer: COMMERCIAL

## 2022-06-02 VITALS
HEIGHT: 69 IN | DIASTOLIC BLOOD PRESSURE: 64 MMHG | HEART RATE: 69 BPM | OXYGEN SATURATION: 97 % | WEIGHT: 166 LBS | SYSTOLIC BLOOD PRESSURE: 110 MMHG | BODY MASS INDEX: 24.59 KG/M2

## 2022-06-02 DIAGNOSIS — I25.10 CORONARY ARTERY DISEASE INVOLVING NATIVE HEART WITHOUT ANGINA PECTORIS, UNSPECIFIED VESSEL OR LESION TYPE: ICD-10-CM

## 2022-06-02 DIAGNOSIS — F51.01 PRIMARY INSOMNIA: ICD-10-CM

## 2022-06-02 DIAGNOSIS — Z12.5 SCREENING PSA (PROSTATE SPECIFIC ANTIGEN): ICD-10-CM

## 2022-06-02 DIAGNOSIS — M54.2 NECK PAIN: ICD-10-CM

## 2022-06-02 DIAGNOSIS — E29.1 HYPOGONADISM MALE: ICD-10-CM

## 2022-06-02 DIAGNOSIS — Z00.00 ROUTINE GENERAL MEDICAL EXAMINATION AT A HEALTH CARE FACILITY: Primary | ICD-10-CM

## 2022-06-02 DIAGNOSIS — F51.04 CHRONIC INSOMNIA: ICD-10-CM

## 2022-06-02 DIAGNOSIS — I25.10 CORONARY ARTERY DISEASE INVOLVING NATIVE CORONARY ARTERY OF NATIVE HEART WITHOUT ANGINA PECTORIS: ICD-10-CM

## 2022-06-02 DIAGNOSIS — E78.00 HYPERCHOLESTEREMIA: ICD-10-CM

## 2022-06-02 DIAGNOSIS — I10 ESSENTIAL HYPERTENSION: ICD-10-CM

## 2022-06-02 PROCEDURE — 99397 PER PM REEVAL EST PAT 65+ YR: CPT | Performed by: INTERNAL MEDICINE

## 2022-06-02 RX ORDER — HYDROCHLOROTHIAZIDE 12.5 MG/1
12.5 TABLET ORAL DAILY
Qty: 90 TABLET | Refills: 3 | Status: SHIPPED | OUTPATIENT
Start: 2022-06-02

## 2022-06-02 RX ORDER — ALPRAZOLAM 0.5 MG
TABLET ORAL
Qty: 30 TABLET | Refills: 1 | Status: SHIPPED | OUTPATIENT
Start: 2022-06-02 | End: 2022-09-12

## 2022-06-02 RX ORDER — TESTOSTERONE CYPIONATE 200 MG/ML
60 INJECTION, SOLUTION INTRAMUSCULAR
COMMUNITY
End: 2022-12-02

## 2022-06-02 RX ORDER — ATORVASTATIN CALCIUM 40 MG/1
40 TABLET, FILM COATED ORAL DAILY
Qty: 90 TABLET | Refills: 3 | Status: CANCELLED | OUTPATIENT
Start: 2022-06-02

## 2022-06-02 RX ORDER — TESTOSTERONE CYPIONATE 1000 MG/10ML
40 INJECTION, SOLUTION INTRAMUSCULAR
COMMUNITY
End: 2022-06-02

## 2022-06-02 RX ORDER — AMLODIPINE BESYLATE 5 MG/1
5 TABLET ORAL DAILY
Qty: 90 TABLET | Refills: 3 | Status: CANCELLED | OUTPATIENT
Start: 2022-06-02

## 2022-06-02 RX ORDER — CELECOXIB 200 MG/1
200 CAPSULE ORAL 2 TIMES DAILY
Qty: 180 CAPSULE | Refills: 3 | Status: SHIPPED | OUTPATIENT
Start: 2022-06-02

## 2022-06-02 RX ORDER — ATORVASTATIN CALCIUM 40 MG/1
40 TABLET, FILM COATED ORAL DAILY
Qty: 90 TABLET | Refills: 3 | Status: SHIPPED | OUTPATIENT
Start: 2022-06-02 | End: 2022-08-15

## 2022-06-02 RX ORDER — LOSARTAN POTASSIUM 50 MG/1
25 TABLET ORAL DAILY
Qty: 90 TABLET | Refills: 3 | Status: SHIPPED | OUTPATIENT
Start: 2022-06-02 | End: 2023-06-07

## 2022-06-02 RX ORDER — ZOLPIDEM TARTRATE 10 MG/1
TABLET ORAL
Qty: 30 TABLET | Refills: 1 | Status: SHIPPED | OUTPATIENT
Start: 2022-06-02 | End: 2022-09-12

## 2022-06-02 ASSESSMENT — ENCOUNTER SYMPTOMS
DYSURIA: 0
CHILLS: 0
SHORTNESS OF BREATH: 0
HEADACHES: 0
EYE PAIN: 0
HEMATURIA: 0
WEAKNESS: 1
SORE THROAT: 0
NERVOUS/ANXIOUS: 1
PALPITATIONS: 0
CONSTIPATION: 0
COUGH: 0
MYALGIAS: 1
ABDOMINAL PAIN: 0
JOINT SWELLING: 0
HEARTBURN: 0
NAUSEA: 0
ARTHRALGIAS: 0
PARESTHESIAS: 0
DIZZINESS: 1
FREQUENCY: 1
FEVER: 0
HEMATOCHEZIA: 0
DIARRHEA: 0

## 2022-06-02 ASSESSMENT — ACTIVITIES OF DAILY LIVING (ADL): CURRENT_FUNCTION: NO ASSISTANCE NEEDED

## 2022-06-02 NOTE — PATIENT INSTRUCTIONS
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 Trenton Parham at [facility] for testosterone replacement therapy.  It sounds like he is on a pretty high dose of 60 mg of testosterone cypionate every 3 days (twice a week) which, if correct, would be 120 mg/week, equals 240 mg every 2 weeks.    Dr. Parham has requested we check his bioavailable testosterone, estradiol levels, CBC, and PSA.  In addition, we will check Dr. Owen routine tests of lipid panel, comprehensive metabolic panel, TSH thyroid test    I renewed his prescriptions for atorvastatin, low-dose hydrochlorothiazide and low-dose losartan.  He will attempt to stop amlodipine, because his blood pressures have been running a bit on the low side.    Also renewed his Ambien and alprazolam.  I reminded him to try to minimize use of these if he can.    Benign essential hypertension, blood pressures been running a bit on the low side, so he reduced losartan to 25 mg a day, continues on hydrochlorothiazide 12.5 mg a day (may also be useful because of his history of calcium containing kidney stones), will drop amlodipine as of June 2, 2022    Insomnia and anxiety, he will continue to use Ambien, and also has alprazolam on hand   He travels a lot and keeps alprazolam on hand for that.  He does not when he used too much Ambien and I agree.       Status post lumbar fusion of L3-4 and decompression of L4-5 done at [facility] January 12, 2021 without complications     Chronic cervical radiculopathy  Status post C7-T1 right-sided microdiscectomy, because of recurrence of radiculopathy symptoms with right hand weakness, late April 2022, Dr. Dickson Valdivia at [facility]  With history of 5 neck procedures, fused  from C4-C6, he will be getting an epidural injection on the left side March 22, and has muscle atrophy of his left trapezius, uses Celebrex for pain relief    Raynaud's phenomenon,   He does not think the amlodipine has particularly helped the Raynaud's, thus stop it     History of dilation of the ascending aorta, stable appearance on CT angiogram March 30, 2021, no symptoms, and his cardiac auscultation is normal.  No symptoms to suggest aortic dissection, and I do not hear any aortic valve murmurs.  Consider rechecking his thoracic CTA in 2024     3- CTA  1.  Stable 3.9 cm ascending aortic aneurysm. Thoracic aorta is ectatic. No great vessel stenosis.  2.  Mild upper lobe and right middle lobe cylindrical bronchiectasis.  3.  Minimal coronary artery calcifications     Hyperlipidemia, with no history of cardiovascular events, on high intensity atorvastatin 40 mg a day, with LDL cholesterol under 100 when checked March 2020.  He had a coronary calcification scan with a score less than 100.     Prostate cancer, status post radical prostatectomy in the late 1990s, undetectable PSAs.  His testosterone therapy is being approved by his urologist    Low testosterone, for which he started replacement therapy in January 2021, initially topical, subsequently switched to every 3-day injections of testosterone cypionate 40 mg q3 days  Manages by Dr Trenton Parham at [facility]    Mild normocytic anemia has been observed since 2017, with hemoglobin that runs in the 13 g range. Hemoglobin levels would need to be monitored while on testosterone, perhaps a mild increase   into the normal range.    Status post right total hip arthroplasty May 2019, then with heterotopic bone excision and psoas tendon release March 2020 at [place]     Nephrolithiasis, known to have stones residing in both kidneys, currently asymptomatic, microhematuria has been observed.    I reminded him about the importance of staying well-hydrated.  1  thing we might be giving up by dropping hydrochlorothiazide is that hydrochlorothiazide tends to decrease calcium excretion and is sometimes used to suppress formation of stones.  We decided to keep the hydrochlorothiazide going for that reason.    Glaucoma, on eyedrops     Satisfactory screening colonoscopy December 2017 with sigmoid diverticulosis, no polyps, recheck optional 5 years after that = 2022    Erectile dysfunction, and low testosterone levels, also uses high-dose sildenafil.      Up-to-date on vaccinations  Includes both pneumococcal vaccines  He got his second booster of Pfizer COVID-19 on May 12, 2022    Immunization History   Administered Date(s) Administered    COVID-19,PF,Pfizer (12+ Yrs) 12/28/2020, 01/18/2021    COVID-19,PF,Pfizer 12+ Yrs (2022 and After) 05/12/2022    FLUAD(HD)65+ QUAD 09/07/2020    Flu 65+ Years 10/18/2019    Flu, Unspecified 09/26/2017    Influenza (H1N1) 11/25/2009    Influenza (High Dose) 3 valent vaccine 10/18/2019    Influenza (IIV3) PF 10/15/2011, 09/27/2013    Influenza Vaccine IM > 6 months Valent IIV4 (Alfuria,Fluzone) 09/07/2020    Pneumo Conj 13-V (2010&after) 12/01/2020    Pneumococcal 23 valent 11/27/2013    Tdap (Adacel,Boostrix) 09/02/2011    Zoster vaccine recombinant adjuvanted (SHINGRIX) 12/01/2020, 02/19/2021    Zoster vaccine, live 10/27/2013

## 2022-06-02 NOTE — PROGRESS NOTES
"SUBJECTIVE:   Clayton Vaca is a 76 year old male who presents for Preventive Visit.    Patient has been advised of split billing requirements and indicates understanding: Yes  Are you in the first 12 months of your Medicare coverage?  No    Healthy Habits:     In general, how would you rate your overall health?  Fair    Frequency of exercise:  2-3 days/week    Duration of exercise:  Less than 15 minutes    Do you usually eat at least 4 servings of fruit and vegetables a day, include whole grains    & fiber and avoid regularly eating high fat or \"junk\" foods?  No    Taking medications regularly:  Yes    Medication side effects:  None    Ability to successfully perform activities of daily living:  No assistance needed    Home Safety:  No safety concerns identified    Hearing Impairment:  No hearing concerns    In the past 6 months, have you been bothered by leaking of urine?  No    In general, how would you rate your overall mental or emotional health?  Good      PHQ-2 Total Score: 1    Do you feel safe in your environment? Yes    Have you ever done Advance Care Planning? (For example, a Health Directive, POLST, or a discussion with a medical provider or your loved ones about your wishes): Yes, advance care planning is on file.       Fall risk  Fallen 2 or more times in the past year?: No  Any fall with injury in the past year?: No    Cognitive Screening   1) Repeat 3 items (Leader, Season, Table)    2) Clock draw: NORMAL  3) 3 item recall: Recalls 3 objects  Results: NORMAL clock, 1-2 items recalled: COGNITIVE IMPAIRMENT LESS LIKELY    Mini-CogTM Copyright AIMEE Harris. Licensed by the author for use in [facility]; reprinted with permission (hever@.[facility]). All rights reserved.      Do you have sleep apnea, excessive snoring or daytime drowsiness?: yes    Reviewed and updated as needed this visit by clinical staff   Tobacco  Allergies  Meds                Reviewed and updated as needed this visit by " "Provider                   Social History     Tobacco Use     Smoking status: Never Smoker     Smokeless tobacco: Never Used     Tobacco comment: 2 cigars per month   Substance Use Topics     Alcohol use: Yes     Comment: Alcoholic Drinks/day: occas         Alcohol Use 5/30/2022   Prescreen: >3 drinks/day or >7 drinks/week? No       Current providers sharing in care for this patient include:   Patient Care Team:  Wil Larson MD as PCP - General  Wil Larson MD as Assigned PCP  Katia Hogue MD as Assigned Neuroscience Provider    The following health maintenance items are reviewed in Epic and correct as of today:  Health Maintenance Due   Topic Date Due     ANNUAL REVIEW OF HM ORDERS  Never done     HEPATITIS C SCREENING  Never done     DTAP/TDAP/TD IMMUNIZATION (2 - Td or Tdap) 09/02/2021         Review of Systems   Constitutional: Negative for chills and fever.   HENT: Negative for congestion, ear pain, hearing loss and sore throat.    Eyes: Negative for pain and visual disturbance.   Respiratory: Negative for cough and shortness of breath.    Cardiovascular: Negative for chest pain, palpitations and peripheral edema.   Gastrointestinal: Negative for abdominal pain, constipation, diarrhea, heartburn, hematochezia and nausea.   Genitourinary: Positive for frequency, impotence and urgency. Negative for dysuria, genital sores, hematuria and penile discharge.   Musculoskeletal: Positive for myalgias. Negative for arthralgias and joint swelling.   Skin: Negative for rash.   Neurological: Positive for dizziness and weakness. Negative for headaches and paresthesias.   Psychiatric/Behavioral: Positive for mood changes. The patient is nervous/anxious.      Constitutional, HEENT, cardiovascular, pulmonary, gi and gu systems are negative, except as otherwise noted.    OBJECTIVE:   /64   Pulse 69   Ht 1.753 m (5' 9\")   Wt 75.3 kg (166 lb)   SpO2 97%   BMI 24.51 kg/m   Estimated body mass index " "is 24.51 kg/m  as calculated from the following:    Height as of this encounter: 1.753 m (5' 9\").    Weight as of this encounter: 75.3 kg (166 lb).  Physical Exam    General: Alert, in no distress  Skin: No significant lesion seen.  Eyes/nose/throat: Eyes without scleral icterus, eye movements normal, pupils equal and reactive, oropharynx clear  + Both tympanic canals are occluded with wax  MSK: + Well-healed C-spine surgery scars, and he still has pretty decent range of motion of his back.  Lymphatic: Neck without adenopathy or masses  Endocrine: Thyroid with no nodules to palpation  Pulm: Lungs clear to auscultation bilaterally  Cardiac: Heart with regular rate and rhythm, no murmur or gallop  GI: Abdomen soft, nontender. No palpable enlargement of liver or spleen  + Old abdominal surgery scars well-healed  MSK: Extremities no tenderness or edema  Neuro: Moves all extremities, without focal weakness  Psych: Alert, normal mental status. 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 Trenton Parham at [facility] for testosterone replacement therapy.  It sounds like he is on a pretty high dose of 60 mg of testosterone cypionate every 3 days (twice a week) which, if correct, would be 120 mg/week, equals 240 mg every 2 weeks.    Dr. Parham has requested we check his bioavailable testosterone, estradiol levels, CBC, and PSA.  In addition, we will check Dr. Owen routine tests of lipid panel, comprehensive metabolic panel, TSH thyroid test    I renewed his prescriptions for atorvastatin, low-dose hydrochlorothiazide and low-dose losartan.  He will attempt to stop amlodipine, because his blood " pressures have been running a bit on the low side.    Also renewed his Ambien and alprazolam.  I reminded him to try to minimize use of these if he can.    Benign essential hypertension, blood pressures been running a bit on the low side, so he reduced losartan to 25 mg a day, continues on hydrochlorothiazide 12.5 mg a day (may also be useful because of his history of calcium containing kidney stones), will drop amlodipine as of June 2, 2022    Insomnia and anxiety, he will continue to use Ambien, and also has alprazolam on hand   He travels a lot and keeps alprazolam on hand for that.  He does not when he used too much Ambien and I agree.       Status post lumbar fusion of L3-4 and decompression of L4-5 done at [facility] January 12, 2021 without complications     Chronic cervical radiculopathy  Status post C7-T1 right-sided microdiscectomy, because of recurrence of radiculopathy symptoms with right hand weakness, late April 2022, Dr. Dickson Valdivia at [facility]  With history of 5 neck procedures, fused from C4-C6, he will be getting an epidural injection on the left side March 22, and has muscle atrophy of his left trapezius, uses Celebrex for pain relief    Raynaud's phenomenon,   He does not think the amlodipine has particularly helped the Raynaud's, thus stop it     History of dilation of the ascending aorta, stable appearance on CT angiogram March 30, 2021, no symptoms, and his cardiac auscultation is normal.  No symptoms to suggest aortic dissection, and I do not hear any aortic valve murmurs.  Consider rechecking his thoracic CTA in 2024     3- CTA  1.  Stable 3.9 cm ascending aortic aneurysm. Thoracic aorta is ectatic. No great vessel stenosis.  2.  Mild upper lobe and right middle lobe cylindrical bronchiectasis.  3.  Minimal coronary artery calcifications     Hyperlipidemia, with no history of cardiovascular events, on high intensity atorvastatin 40 mg a day, with LDL cholesterol  under 100 when checked March 2020.  He had a coronary calcification scan with a score less than 100.     Prostate cancer, status post radical prostatectomy in the late 1990s, undetectable PSAs.  His testosterone therapy is being approved by his urologist    Low testosterone, for which he started replacement therapy in January 2021, initially topical, subsequently switched to every 3-day injections of testosterone cypionate 60 mg q3 days  Manages by Dr Trenton Parham at [facility]    Mild normocytic anemia has been observed since 2017, with hemoglobin that runs in the 13 g range. Hemoglobin levels would need to be monitored while on testosterone, perhaps a mild increase   into the normal range.    Status post right total hip arthroplasty May 2019, then with heterotopic bone excision and psoas tendon release March 2020 at [place]     Nephrolithiasis, known to have stones residing in both kidneys, currently asymptomatic, microhematuria has been observed.    I reminded him about the importance of staying well-hydrated.  1 thing we might be giving up by dropping hydrochlorothiazide is that hydrochlorothiazide tends to decrease calcium excretion and is sometimes used to suppress formation of stones.  We decided to keep the hydrochlorothiazide going for that reason.    Glaucoma, on eyedrops     Satisfactory screening colonoscopy December 2017 with sigmoid diverticulosis, no polyps, recheck optional 5 years after that = 2022    Erectile dysfunction, and low testosterone levels, also uses high-dose sildenafil.      Up-to-date on vaccinations  Includes both pneumococcal vaccines  He got his second booster of Pfizer COVID-19 on May 12, 2022    Immunization History   Administered Date(s) Administered     COVID-19,PF,Pfizer (12+ Yrs) 12/28/2020, 01/18/2021     COVID-19,PF,Pfizer 12+ Yrs (2022 and After) 05/12/2022     FLUAD(HD)65+ QUAD 09/07/2020     Flu 65+ Years 10/18/2019     Flu, Unspecified 09/26/2017     Influenza (H1N1)  "11/25/2009     Influenza (High Dose) 3 valent vaccine 10/18/2019     Influenza (IIV3) PF 10/15/2011, 09/27/2013     Influenza Vaccine IM > 6 months Valent IIV4 (Alfuria,Fluzone) 09/07/2020     Pneumo Conj 13-V (2010&after) 12/01/2020     Pneumococcal 23 valent 11/27/2013     Tdap (Adacel,Boostrix) 09/02/2011     Zoster vaccine recombinant adjuvanted (SHINGRIX) 12/01/2020, 02/19/2021     Zoster vaccine, live 10/27/2013       COUNSELING:  Reviewed preventive health counseling, as reflected in patient instructions       Healthy diet/nutrition    Estimated body mass index is 24.51 kg/m  as calculated from the following:    Height as of this encounter: 1.753 m (5' 9\").    Weight as of this encounter: 75.3 kg (166 lb).      He reports that he has never smoked. He has never used smokeless tobacco.  Appropriate preventive services were discussed with this patient, including applicable screening as appropriate for cardiovascular disease, diabetes, osteopenia/osteoporosis, and glaucoma.  As appropriate for age/gender, discussed screening for colorectal cancer, prostate cancer, breast cancer, and cervical cancer. Checklist reviewing preventive services available has been given to the patient.    Reviewed patients plan of care and provided an AVS. The Basic Care Plan (routine screening as documented in Health Maintenance) for Clayton meets the Care Plan requirement. This Care Plan has been established and reviewed with the Patient.      MARIA A MERRILL MD  [facility]    "

## 2022-06-07 ENCOUNTER — LAB (OUTPATIENT)
Dept: LAB | Facility: CLINIC | Age: 77
End: 2022-06-07
Payer: COMMERCIAL

## 2022-06-07 DIAGNOSIS — Z00.00 ROUTINE GENERAL MEDICAL EXAMINATION AT A HEALTH CARE FACILITY: ICD-10-CM

## 2022-06-07 DIAGNOSIS — E29.1 HYPOGONADISM MALE: ICD-10-CM

## 2022-06-07 DIAGNOSIS — Z12.5 SCREENING PSA (PROSTATE SPECIFIC ANTIGEN): ICD-10-CM

## 2022-06-07 DIAGNOSIS — E78.00 HYPERCHOLESTEREMIA: ICD-10-CM

## 2022-06-07 LAB
ALBUMIN SERPL-MCNC: 3.9 G/DL (ref 3.5–5)
ALP SERPL-CCNC: 76 U/L (ref 45–120)
ALT SERPL W P-5'-P-CCNC: 12 U/L (ref 0–45)
ANION GAP SERPL CALCULATED.3IONS-SCNC: 9 MMOL/L (ref 5–18)
AST SERPL W P-5'-P-CCNC: 26 U/L (ref 0–40)
BILIRUB SERPL-MCNC: 0.9 MG/DL (ref 0–1)
BUN SERPL-MCNC: 21 MG/DL (ref 8–28)
CALCIUM SERPL-MCNC: 9.4 MG/DL (ref 8.5–10.5)
CHLORIDE BLD-SCNC: 105 MMOL/L (ref 98–107)
CHOLEST SERPL-MCNC: 175 MG/DL
CO2 SERPL-SCNC: 27 MMOL/L (ref 22–31)
CREAT SERPL-MCNC: 0.92 MG/DL (ref 0.7–1.3)
ERYTHROCYTE [DISTWIDTH] IN BLOOD BY AUTOMATED COUNT: 13.1 % (ref 10–15)
ESTRADIOL SERPL-MCNC: 66 PG/ML
FASTING STATUS PATIENT QL REPORTED: YES
GFR SERPL CREATININE-BSD FRML MDRD: 87 ML/MIN/1.73M2
GLUCOSE BLD-MCNC: 91 MG/DL (ref 70–125)
HCT VFR BLD AUTO: 47.1 % (ref 40–53)
HDLC SERPL-MCNC: 57 MG/DL
HGB BLD-MCNC: 15.8 G/DL (ref 13.3–17.7)
LDLC SERPL CALC-MCNC: 102 MG/DL
MCH RBC QN AUTO: 31.2 PG (ref 26.5–33)
MCHC RBC AUTO-ENTMCNC: 33.5 G/DL (ref 31.5–36.5)
MCV RBC AUTO: 93 FL (ref 78–100)
PLATELET # BLD AUTO: 288 10E3/UL (ref 150–450)
POTASSIUM BLD-SCNC: 4.4 MMOL/L (ref 3.5–5)
PROT SERPL-MCNC: 6.3 G/DL (ref 6–8)
PSA SERPL-MCNC: <0.1 UG/L (ref 0–6.5)
RBC # BLD AUTO: 5.06 10E6/UL (ref 4.4–5.9)
SHBG SERPL-SCNC: 63 NMOL/L (ref 11–80)
SODIUM SERPL-SCNC: 141 MMOL/L (ref 136–145)
TRIGL SERPL-MCNC: 79 MG/DL
TSH SERPL DL<=0.005 MIU/L-ACNC: 1.84 UIU/ML (ref 0.3–5)
WBC # BLD AUTO: 6.6 10E3/UL (ref 4–11)

## 2022-06-07 PROCEDURE — G0103 PSA SCREENING: HCPCS

## 2022-06-07 PROCEDURE — 36415 COLL VENOUS BLD VENIPUNCTURE: CPT

## 2022-06-07 PROCEDURE — 84403 ASSAY OF TOTAL TESTOSTERONE: CPT

## 2022-06-07 PROCEDURE — 84443 ASSAY THYROID STIM HORMONE: CPT

## 2022-06-07 PROCEDURE — 82670 ASSAY OF TOTAL ESTRADIOL: CPT

## 2022-06-07 PROCEDURE — 85027 COMPLETE CBC AUTOMATED: CPT

## 2022-06-07 PROCEDURE — 80053 COMPREHEN METABOLIC PANEL: CPT

## 2022-06-07 PROCEDURE — 80061 LIPID PANEL: CPT

## 2022-06-07 PROCEDURE — 84270 ASSAY OF SEX HORMONE GLOBUL: CPT

## 2022-06-09 LAB
TESTOST FREE SERPL-MCNC: 15.32 NG/DL
TESTOST SERPL-MCNC: 989 NG/DL (ref 240–950)

## 2022-08-14 DIAGNOSIS — I25.10 CORONARY ARTERY DISEASE INVOLVING NATIVE HEART WITHOUT ANGINA PECTORIS, UNSPECIFIED VESSEL OR LESION TYPE: ICD-10-CM

## 2022-08-14 DIAGNOSIS — I10 ESSENTIAL HYPERTENSION: Primary | ICD-10-CM

## 2022-08-14 DIAGNOSIS — E78.00 HYPERCHOLESTEREMIA: ICD-10-CM

## 2022-08-15 RX ORDER — HYDROCHLOROTHIAZIDE 12.5 MG/1
CAPSULE ORAL
Qty: 90 CAPSULE | Refills: 2 | Status: SHIPPED | OUTPATIENT
Start: 2022-08-15 | End: 2022-12-02

## 2022-08-15 RX ORDER — ATORVASTATIN CALCIUM 40 MG/1
40 TABLET, FILM COATED ORAL DAILY
Qty: 90 TABLET | Refills: 2 | Status: SHIPPED | OUTPATIENT
Start: 2022-08-15 | End: 2023-07-13

## 2022-09-12 DIAGNOSIS — F51.01 PRIMARY INSOMNIA: ICD-10-CM

## 2022-09-12 DIAGNOSIS — F41.9 ANXIETY: Primary | ICD-10-CM

## 2022-09-12 DIAGNOSIS — F51.04 CHRONIC INSOMNIA: ICD-10-CM

## 2022-09-12 RX ORDER — ZOLPIDEM TARTRATE 5 MG/1
5 TABLET ORAL
Qty: 30 TABLET | Refills: 0 | Status: SHIPPED | OUTPATIENT
Start: 2022-09-12 | End: 2022-12-02 | Stop reason: DRUGHIGH

## 2022-09-12 RX ORDER — ALPRAZOLAM 0.5 MG
0.5 TABLET ORAL
Qty: 30 TABLET | Refills: 1 | Status: SHIPPED | OUTPATIENT
Start: 2022-09-12 | End: 2023-10-04

## 2022-10-17 DIAGNOSIS — F51.5 NIGHTMARES: ICD-10-CM

## 2022-10-17 DIAGNOSIS — F51.04 CHRONIC INSOMNIA: Primary | ICD-10-CM

## 2022-10-28 ENCOUNTER — TELEPHONE (OUTPATIENT)
Dept: INTERNAL MEDICINE | Facility: CLINIC | Age: 77
End: 2022-10-28

## 2022-10-30 ENCOUNTER — HEALTH MAINTENANCE LETTER (OUTPATIENT)
Age: 77
End: 2022-10-30

## 2022-12-02 ENCOUNTER — OFFICE VISIT (OUTPATIENT)
Dept: INTERNAL MEDICINE | Facility: CLINIC | Age: 77
End: 2022-12-02
Payer: COMMERCIAL

## 2022-12-02 VITALS
DIASTOLIC BLOOD PRESSURE: 62 MMHG | SYSTOLIC BLOOD PRESSURE: 100 MMHG | WEIGHT: 166 LBS | OXYGEN SATURATION: 97 % | BODY MASS INDEX: 24.59 KG/M2 | HEIGHT: 69 IN | TEMPERATURE: 98.4 F | RESPIRATION RATE: 16 BRPM | HEART RATE: 75 BPM

## 2022-12-02 DIAGNOSIS — I10 ESSENTIAL HYPERTENSION: ICD-10-CM

## 2022-12-02 DIAGNOSIS — E29.1 HYPOGONADISM MALE: Primary | ICD-10-CM

## 2022-12-02 DIAGNOSIS — F51.04 CHRONIC INSOMNIA: ICD-10-CM

## 2022-12-02 PROBLEM — D64.9 NORMOCHROMIC ANEMIA: Status: RESOLVED | Noted: 2021-03-18 | Resolved: 2022-12-02

## 2022-12-02 PROCEDURE — 99213 OFFICE O/P EST LOW 20 MIN: CPT | Performed by: INTERNAL MEDICINE

## 2022-12-02 RX ORDER — ZOLPIDEM TARTRATE 10 MG/1
0.5 TABLET ORAL
COMMUNITY
Start: 2022-11-07 | End: 2023-10-04 | Stop reason: DRUGHIGH

## 2022-12-02 NOTE — PATIENT INSTRUCTIONS
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  He continues to take testosterone injections every 2 weeks, set up every week, and continues to feel well.  Exercises vigorously.  Not bothered by any claudication symptoms (he did have a suggestive arterial Doppler study that was performed at his home by [facility])    Sleep is still an issue, and Clayton is using some Ambien and occasionally supplements with alprazolam.  I recommended that he move forward with a sleep clinic consultation, and I will give him the phone number to schedule that.    He already received his bivalent COVID-19 vaccine and seasonal flu vaccine.    I suggested to check that he could go ahead and get another surveillance colonoscopy either 2022 or 2023    Blood pressure looks just great, and he continues on high-dose atorvastatin.  He stopped amlodipine as per our last discussion     Insomnia and anxiety, he will continue to use Ambien, and also has alprazolam on hand --We will proceed with sleep clinic consultation   I reminded him to try to minimize use of these if he can.     Benign essential hypertension, blood pressures been running a bit on the low side, so he reduced  losartan 25 mg once a day and hydrochlorothiazide 12.5 mg a day  Hydrochlorothiazide may also be useful because of his history of calcium containing kidney stones),  dropped amlodipine as of June 2, 2022     Status post lumbar fusion of L3-4 and decompression of L4-5 done at [facility] January 12, 2021 without complications     Chronic cervical radiculopathy  Status post C7-T1 right-sided microdiscectomy, because of recurrence of radiculopathy symptoms with right hand weakness, late April 2022, Dr. Dickson Valdivia at [facility]  With history of 5 neck procedures, fused from C4-C6, he will be getting an epidural injection on the  left side March 22, and has muscle atrophy of his left trapezius, uses Celebrex for pain relief     Raynaud's phenomenon,   He does not think the amlodipine has particularly helped the Raynaud's, thus stop it     History of dilation of the ascending aorta, stable appearance on CT angiogram March 30, 2021, no symptoms, and his cardiac auscultation is normal.  No symptoms to suggest aortic dissection, and I do not hear any aortic valve murmurs.  Consider rechecking his thoracic CTA in 2024    3- CTA  1.  Stable 3.9 cm ascending aortic aneurysm. Thoracic aorta is ectatic. No great vessel stenosis.  2.  Mild upper lobe and right middle lobe cylindrical bronchiectasis.  3.  Minimal coronary artery calcifications     Hyperlipidemia, with no history of cardiovascular events, on high intensity atorvastatin 40 mg a day, with LDL cholesterol under 100 when checked March 2020. He had a coronary calcification scan with a score less than 100.     Prostate cancer, status post radical prostatectomy in the late 1990s, undetectable PSAs.  His testosterone therapy is being approved by his urologist     Low testosterone, for which he started replacement therapy in January 2021, initially topical, subsequently switched to every 3-day injections of testosterone cypionate 60 mg q3 days.  Manages by Dr Trenton Parham at [facility]    He has been working with Dr. Trenton Parham at [facility] for testosterone replacement therapy.     As of November as of December 2022, it sounds like Clayton's dose is around 100 mg per 2 weeks    6-7-2022  Prostate Specific Antigen Screen 0.00 - 6.50 ug/L <0.10      Normalized hemoglobin as of June 2022 6-7-2022  Hemoglobin 13.3 - 17.7 g/dL 15.8      Status post right total hip arthroplasty May 2019, then with heterotopic bone excision and psoas tendon release March 2020 at [place]     Nephrolithiasis, known to have stones residing in both kidneys, currently asymptomatic, microhematuria has been  observed.    I reminded him about the importance of staying well-hydrated.  1 thing we might be giving up by dropping hydrochlorothiazide is that hydrochlorothiazide tends to decrease calcium excretion and is sometimes used to suppress formation of stones.  We decided to keep the hydrochlorothiazide going for that reason.     Glaucoma, on eyedrops     Satisfactory screening colonoscopy December 2017 with sigmoid diverticulosis, no polyps, recheck optional 5 years after that = 2022     Erectile dysfunction, on testosterone replacement also uses high-dose sildenafil.       Up-to-date on vaccinations  Includes both pneumococcal vaccines    COVID-19 Vaccine Bivalent Booster 12+ (Pfizer) 09/23/2022     FLUAD(HD)65+ QUAD 09/07/2020, 09/21/2021, 11/09/2022

## 2022-12-02 NOTE — PROGRESS NOTES
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  He continues to take testosterone injections every 2 weeks, set up every week, and continues to feel well.  Exercises vigorously.  Not bothered by any claudication symptoms (he did have a suggestive arterial Doppler study that was performed at his home by [facility])    Sleep is still an issue, and Clayton is using some Ambien and occasionally supplements with alprazolam.  I recommended that he move forward with a sleep clinic consultation, and I will give him the phone number to schedule that.    He already received his bivalent COVID-19 vaccine and seasonal flu vaccine.    I suggested to check that he could go ahead and get another surveillance colonoscopy either 2022 or 2023    Blood pressure looks just great, and he continues on high-dose atorvastatin.  He stopped amlodipine as per our last discussion     Insomnia and anxiety, he will continue to use Ambien, and also has alprazolam on hand --We will proceed with sleep clinic consultation   I reminded him to try to minimize use of these if he can.     Benign essential hypertension, blood pressures been running a bit on the low side, so he reduced  losartan 25 mg once a day and hydrochlorothiazide 12.5 mg a day  Hydrochlorothiazide may also be useful because of his history of calcium containing kidney stones),  dropped amlodipine as of June 2, 2022     Status post lumbar  fusion of L3-4 and decompression of L4-5 done at [facility] January 12, 2021 without complications     Chronic cervical radiculopathy  Status post C7-T1 right-sided microdiscectomy, because of recurrence of radiculopathy symptoms with right hand weakness, late April 2022, Dr. Dickson Valdivia at [facility]  With history of 5 neck procedures, fused from C4-C6, he will be getting an epidural injection on the left side March 22, and has muscle atrophy of his left trapezius, uses Celebrex for pain relief     Raynaud's phenomenon,   He does not think the amlodipine has particularly helped the Raynaud's, thus stop it     History of dilation of the ascending aorta, stable appearance on CT angiogram March 30, 2021, no symptoms, and his cardiac auscultation is normal.  No symptoms to suggest aortic dissection, and I do not hear any aortic valve murmurs.  Consider rechecking his thoracic CTA in 2024    3- CTA  1.  Stable 3.9 cm ascending aortic aneurysm. Thoracic aorta is ectatic. No great vessel stenosis.  2.  Mild upper lobe and right middle lobe cylindrical bronchiectasis.  3.  Minimal coronary artery calcifications     Hyperlipidemia, with no history of cardiovascular events, on high intensity atorvastatin 40 mg a day, with LDL cholesterol under 100 when checked March 2020. He had a coronary calcification scan with a score less than 100.     Prostate cancer, status post radical prostatectomy in the late 1990s, undetectable PSAs.  His testosterone therapy is being approved by his urologist     Low testosterone, for which he started replacement therapy in January 2021, initially topical, subsequently switched to every 3-day injections of testosterone cypionate 60 mg q3 days.  Manages by Dr Trenton Parham at [facility]    He has been working with Dr. Trenton Parham at [facility] for testosterone replacement therapy.     As of November as of December 2022, it sounds like Clayton's dose is around 100  mg per 2 weeks    6-7-2022  Prostate Specific Antigen Screen 0.00 - 6.50 ug/L <0.10      Normalized hemoglobin as of June 2022 6-7-2022  Hemoglobin 13.3 - 17.7 g/dL 15.8      Status post right total hip arthroplasty May 2019, then with heterotopic bone excision and psoas tendon release March 2020 at [place]     Nephrolithiasis, known to have stones residing in both kidneys, currently asymptomatic, microhematuria has been observed.    I reminded him about the importance of staying well-hydrated.  1 thing we might be giving up by dropping hydrochlorothiazide is that hydrochlorothiazide tends to decrease calcium excretion and is sometimes used to suppress formation of stones.  We decided to keep the hydrochlorothiazide going for that reason.     Glaucoma, on eyedrops     Satisfactory screening colonoscopy December 2017 with sigmoid diverticulosis, no polyps, recheck optional 5 years after that = 2022     Erectile dysfunction, on testosterone replacement also uses high-dose sildenafil.       Up-to-date on vaccinations  Includes both pneumococcal vaccines    COVID-19 Vaccine Bivalent Booster 12+ (Pfizer) 09/23/2022     FLUAD(HD)65+ QUAD 09/07/2020, 09/21/2021, 11/09/2022           --------------------------------------------------------------------------------------------------------------------------  History of Present Illness  This 77 year old old     Follow Up (Left leg/foot discolored (blue) - check up - not fasting - trouble sleeping, hard to get back to sleep, discuss sleep study)      Hypertension: He presents for follow up of hypertension.  He does check blood pressure  regularly outside of the clinic. Outpatient blood pressures have not been over 140/90. He follows a low salt diet.     Vascular Disease:  He presents for follow up of vascular disease.  He never takes nitroglycerin. He is not taking daily aspirin.        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  He continues to take testosterone injections every 2 weeks, set up every week, and continues to feel well.  Exercises vigorously.  Not bothered by any claudication symptoms (he did have a suggestive arterial Doppler study that was performed at his home by [facility])    Sleep is still an issue, and Jack is using some Ambien and occasionally supplements with alprazolam.  I recommended that he move forward with a sleep clinic consultation, and I will give him the phone number to schedule that.    He already received his bivalent COVID-19 vaccine and seasonal flu vaccine.    I suggested to check that he could go ahead and get another surveillance colonoscopy either 2022 or 2023    Blood pressure looks just great, and he continues on high-dose atorvastatin.  He stopped amlodipine as per our last discussion    Wt Readings from Last 3 Encounters:   12/02/22 75.3 kg (166 lb)   06/02/22 75.3 kg (166 lb)   06/03/21 74.9 kg (165 lb 1.6 oz)     BP Readings from Last 3 Encounters:   12/02/22 100/62   06/02/22 110/64   06/03/21 104/70         ---------------------------------------------------------------------------------------------------------------------------    Medications, Allergies, Social, and Problem List   Current Outpatient Medications   Medication Sig Dispense Refill     ALPRAZolam (XANAX) 0.5 MG tablet Take 1 tablet (0.5 mg) by mouth nightly as needed for anxiety DO NOT TAKE WITHIN 12 HOURS OF ZOLPIDEM 30 tablet 1     atorvastatin (LIPITOR) 40 MG tablet Take 1 tablet (40 mg) by mouth daily 90 tablet 2     celecoxib (CELEBREX) 200 MG capsule Take 1 capsule (200 mg) by mouth 2 times daily 180 capsule 3     hydrochlorothiazide (HYDRODIURIL) 12.5 MG tablet Take 1 tablet (12.5 mg) by mouth daily 90 tablet 3     latanoprost (XALATAN) 0.005 % ophthalmic solution [LATANOPROST (XALATAN) 0.005 % OPHTHALMIC SOLUTION] Apply 1 drop to  "eye.       losartan (COZAAR) 50 MG tablet Take 0.5 tablets (25 mg) by mouth daily 90 tablet 3     timoloL maleate (TIMOPTIC) 0.5 % ophthalmic solution [TIMOLOL MALEATE (TIMOPTIC) 0.5 % OPHTHALMIC SOLUTION] INSTILL BY OPHTHALMIC ROUTE EVERY MORNING INTO BOTH EYES.       zolpidem (AMBIEN) 10 MG tablet Take 0.5 tablets by mouth three times a week       amLODIPine (NORVASC) 5 MG tablet [AMLODIPINE (NORVASC) 5 MG TABLET] Take 1 tablet (5 mg total) by mouth daily. (Patient not taking: Reported on 12/2/2022) 90 tablet 3     Allergies   Allergen Reactions     Demerol [Meperidine] Nausea     Morphine (Pf) [Morphine] Nausea     Nitrous Oxide Nausea     Social History     Tobacco Use     Smoking status: Never     Smokeless tobacco: Never     Tobacco comments:     2 cigars per month   Vaping Use     Vaping Use: Never used   Substance Use Topics     Alcohol use: Yes     Comment: Alcoholic Drinks/day: occas     Drug use: No     Patient Active Problem List   Diagnosis     Calculus of ureter     Calculus of kidney     Essential hypertension     Hypercholesterolemia     Thoracic ascending aortic aneurysm     Anxiety     Primary insomnia     S/P lumbar fusion     S/P cervical spinal fusion     Cervical radiculopathy     Raynaud phenomenon     Normochromic anemia     Other male erectile dysfunction     Hypogonadism male        Reviewed, reconciled and updated       Physical Exam   General Appearance:       /62 (BP Location: Right arm, Patient Position: Sitting, Cuff Size: Adult Regular)   Pulse 75   Temp 98.4  F (36.9  C)   Resp 16   Ht 1.753 m (5' 9\")   Wt 75.3 kg (166 lb)   SpO2 97%   BMI 24.51 kg/m      Appears well, blood pressure is excellent, lungs clear, heart regular rate and rhythm, no leg edema     Additional Information   I spent 20 minutes on this encounter, including reviewing interval history since last visit, examining the patient, explaining and counseling the issues enumerated in the Assessment and Plan " (patient given a copy)     MARIA A MERRILL MD, MD

## 2023-02-21 ENCOUNTER — TRANSFERRED RECORDS (OUTPATIENT)
Dept: HEALTH INFORMATION MANAGEMENT | Facility: CLINIC | Age: 78
End: 2023-02-21

## 2023-04-19 DIAGNOSIS — F51.04 CHRONIC INSOMNIA: Primary | ICD-10-CM

## 2023-04-19 RX ORDER — ZOLPIDEM TARTRATE 5 MG/1
5 TABLET ORAL
Qty: 60 TABLET | Refills: 0 | Status: SHIPPED | OUTPATIENT
Start: 2023-04-19 | End: 2023-10-04

## 2023-06-06 DIAGNOSIS — I10 ESSENTIAL HYPERTENSION: ICD-10-CM

## 2023-06-07 ENCOUNTER — TRANSFERRED RECORDS (OUTPATIENT)
Dept: HEALTH INFORMATION MANAGEMENT | Facility: CLINIC | Age: 78
End: 2023-06-07
Payer: COMMERCIAL

## 2023-06-07 RX ORDER — LOSARTAN POTASSIUM 50 MG/1
TABLET ORAL
Qty: 90 TABLET | Refills: 0 | Status: SHIPPED | OUTPATIENT
Start: 2023-06-07 | End: 2023-10-19

## 2023-06-07 RX ORDER — HYDROCHLOROTHIAZIDE 12.5 MG/1
CAPSULE ORAL
Qty: 90 CAPSULE | Refills: 0 | Status: SHIPPED | OUTPATIENT
Start: 2023-06-07 | End: 2023-10-04

## 2023-06-07 NOTE — TELEPHONE ENCOUNTER
"  Last Written Prescription Date:  6/2/2022  Last Fill Quantity: 90,  # refills: 3   Last office visit provider:  12/2/2022     Last Written Prescription Date:  6/2/2022  Last Fill Quantity: 90,  # refills: 3   Last office visit provider:  12/2/2022     Requested Prescriptions   Pending Prescriptions Disp Refills     losartan (COZAAR) 50 MG tablet [Pharmacy Med Name: *LOSARTAN POT 50MG] 90 tablet 1     Sig: TAKE 1 TABLET (50 MG TOTAL) BY MOUTH DAILY.       Angiotensin-II Receptors Passed - 6/7/2023  7:49 AM        Passed - Last blood pressure under 140/90 in past 12 months     BP Readings from Last 3 Encounters:   12/02/22 100/62   06/02/22 110/64   06/03/21 104/70                 Passed - Recent (12 mo) or future (30 days) visit within the authorizing provider's specialty     Patient has had an office visit with the authorizing provider or a provider within the authorizing providers department within the previous 12 mos or has a future within next 30 days. See \"Patient Info\" tab in inbasket, or \"Choose Columns\" in Meds & Orders section of the refill encounter.              Passed - Medication is active on med list        Passed - Patient is age 18 or older        Passed - Normal serum creatinine on file in past 12 months     Recent Labs   Lab Test 06/07/22  0831   CR 0.92       Ok to refill medication if creatinine is low          Passed - Normal serum potassium on file in past 12 months     Recent Labs   Lab Test 06/07/22  0831   POTASSIUM 4.4                       hydrochlorothiazide (MICROZIDE) 12.5 MG capsule [Pharmacy Med Name: HYDROCHLOROTHIAZIDE 12.5MG] 90 capsule 1     Sig: TAKE 1 CAPSULE (12.5 MG TOTAL) BY MOUTH DAILY.       Diuretics (Including Combos) Protocol Passed - 6/7/2023  7:49 AM        Passed - Blood pressure under 140/90 in past 12 months     BP Readings from Last 3 Encounters:   12/02/22 100/62   06/02/22 110/64   06/03/21 104/70                 Passed - Recent (12 mo) or future (30 days) visit " "within the authorizing provider's specialty     Patient has had an office visit with the authorizing provider or a provider within the authorizing providers department within the previous 12 mos or has a future within next 30 days. See \"Patient Info\" tab in inbasket, or \"Choose Columns\" in Meds & Orders section of the refill encounter.              Passed - Medication is active on med list        Passed - Patient is age 18 or older        Passed - Normal serum creatinine on file in past 12 months     Recent Labs   Lab Test 06/07/22  0831   CR 0.92              Passed - Normal serum potassium on file in past 12 months     Recent Labs   Lab Test 06/07/22  0831   POTASSIUM 4.4                    Passed - Normal serum sodium on file in past 12 months     Recent Labs   Lab Test 06/07/22  0831                      Millicent Dorsey RN 06/07/23 7:49 AM  "

## 2023-09-03 ENCOUNTER — HEALTH MAINTENANCE LETTER (OUTPATIENT)
Age: 78
End: 2023-09-03

## 2023-10-04 ENCOUNTER — OFFICE VISIT (OUTPATIENT)
Dept: INTERNAL MEDICINE | Facility: CLINIC | Age: 78
End: 2023-10-04
Payer: COMMERCIAL

## 2023-10-04 VITALS
OXYGEN SATURATION: 98 % | RESPIRATION RATE: 16 BRPM | BODY MASS INDEX: 24.29 KG/M2 | SYSTOLIC BLOOD PRESSURE: 114 MMHG | TEMPERATURE: 98.1 F | WEIGHT: 164 LBS | HEART RATE: 79 BPM | HEIGHT: 69 IN | DIASTOLIC BLOOD PRESSURE: 60 MMHG

## 2023-10-04 DIAGNOSIS — Z12.5 SCREENING PSA (PROSTATE SPECIFIC ANTIGEN): ICD-10-CM

## 2023-10-04 DIAGNOSIS — F51.04 CHRONIC INSOMNIA: ICD-10-CM

## 2023-10-04 DIAGNOSIS — Z00.00 ROUTINE GENERAL MEDICAL EXAMINATION AT A HEALTH CARE FACILITY: Primary | ICD-10-CM

## 2023-10-04 DIAGNOSIS — F51.01 PRIMARY INSOMNIA: ICD-10-CM

## 2023-10-04 DIAGNOSIS — F41.9 ANXIETY: ICD-10-CM

## 2023-10-04 DIAGNOSIS — E78.00 HYPERCHOLESTEROLEMIA: ICD-10-CM

## 2023-10-04 DIAGNOSIS — Z51.81 ENCOUNTER FOR THERAPEUTIC DRUG MONITORING: ICD-10-CM

## 2023-10-04 DIAGNOSIS — I71.21 ANEURYSM OF ASCENDING AORTA WITHOUT RUPTURE (H): ICD-10-CM

## 2023-10-04 DIAGNOSIS — E29.1 HYPOGONADISM MALE: ICD-10-CM

## 2023-10-04 DIAGNOSIS — Z98.1 S/P CERVICAL SPINAL FUSION: ICD-10-CM

## 2023-10-04 PROCEDURE — 99214 OFFICE O/P EST MOD 30 MIN: CPT | Mod: 25 | Performed by: INTERNAL MEDICINE

## 2023-10-04 PROCEDURE — G0439 PPPS, SUBSEQ VISIT: HCPCS | Performed by: INTERNAL MEDICINE

## 2023-10-04 RX ORDER — ESCITALOPRAM OXALATE 10 MG/1
10 TABLET ORAL DAILY
Qty: 30 TABLET | Refills: 4 | Status: SHIPPED | OUTPATIENT
Start: 2023-10-04 | End: 2024-03-01

## 2023-10-04 RX ORDER — ZOLPIDEM TARTRATE 5 MG/1
5 TABLET ORAL
Qty: 60 TABLET | Refills: 0 | Status: SHIPPED | OUTPATIENT
Start: 2023-10-04 | End: 2024-03-18

## 2023-10-04 RX ORDER — ALPRAZOLAM 0.5 MG
0.5 TABLET ORAL
Qty: 30 TABLET | Refills: 1 | Status: SHIPPED | OUTPATIENT
Start: 2023-10-04

## 2023-10-04 ASSESSMENT — PATIENT HEALTH QUESTIONNAIRE - PHQ9
10. IF YOU CHECKED OFF ANY PROBLEMS, HOW DIFFICULT HAVE THESE PROBLEMS MADE IT FOR YOU TO DO YOUR WORK, TAKE CARE OF THINGS AT HOME, OR GET ALONG WITH OTHER PEOPLE: NOT DIFFICULT AT ALL
SUM OF ALL RESPONSES TO PHQ QUESTIONS 1-9: 4
SUM OF ALL RESPONSES TO PHQ QUESTIONS 1-9: 4

## 2023-10-04 ASSESSMENT — ENCOUNTER SYMPTOMS
NAUSEA: 0
PARESTHESIAS: 0
HEMATOCHEZIA: 0
MYALGIAS: 0
CONSTIPATION: 0
COUGH: 0
HEADACHES: 1
DYSURIA: 0
JOINT SWELLING: 0
CHILLS: 0
WEAKNESS: 1
HEMATURIA: 0
DIARRHEA: 0
SHORTNESS OF BREATH: 0
PALPITATIONS: 0
FEVER: 0
ABDOMINAL PAIN: 0
FREQUENCY: 1
SORE THROAT: 0
DIZZINESS: 0
ARTHRALGIAS: 1
NERVOUS/ANXIOUS: 1
EYE PAIN: 0
HEARTBURN: 0

## 2023-10-04 ASSESSMENT — ANXIETY QUESTIONNAIRES
GAD7 TOTAL SCORE: 9
1. FEELING NERVOUS, ANXIOUS, OR ON EDGE: MORE THAN HALF THE DAYS
6. BECOMING EASILY ANNOYED OR IRRITABLE: SEVERAL DAYS
3. WORRYING TOO MUCH ABOUT DIFFERENT THINGS: MORE THAN HALF THE DAYS
GAD7 TOTAL SCORE: 9
7. FEELING AFRAID AS IF SOMETHING AWFUL MIGHT HAPPEN: NOT AT ALL
5. BEING SO RESTLESS THAT IT IS HARD TO SIT STILL: SEVERAL DAYS
4. TROUBLE RELAXING: MORE THAN HALF THE DAYS
2. NOT BEING ABLE TO STOP OR CONTROL WORRYING: SEVERAL DAYS
IF YOU CHECKED OFF ANY PROBLEMS ON THIS QUESTIONNAIRE, HOW DIFFICULT HAVE THESE PROBLEMS MADE IT FOR YOU TO DO YOUR WORK, TAKE CARE OF THINGS AT HOME, OR GET ALONG WITH OTHER PEOPLE: NOT DIFFICULT AT ALL

## 2023-10-04 ASSESSMENT — ACTIVITIES OF DAILY LIVING (ADL): CURRENT_FUNCTION: NO ASSISTANCE NEEDED

## 2023-10-04 NOTE — PROGRESS NOTES
"SUBJECTIVE:   Jack is a 78 year old who presents for Preventive Visit.      10/4/2023     4:23 PM   Additional Questions   Roomed by Arminda       Are you in the first 12 months of your Medicare coverage?  No    Healthy Habits:     In general, how would you rate your overall health?  Good    Frequency of exercise:  1 day/week    Duration of exercise:  30-45 minutes    Do you usually eat at least 4 servings of fruit and vegetables a day, include whole grains    & fiber and avoid regularly eating high fat or \"junk\" foods?  Yes    Taking medications regularly:  Yes    Medication side effects:  None    Ability to successfully perform activities of daily living:  No assistance needed    Home Safety:  No safety concerns identified    Hearing Impairment:  No hearing concerns    In the past 6 months, have you been bothered by leaking of urine?  No    In general, how would you rate your overall mental or emotional health?  Good    Additional concerns today:  No      Today's PHQ-9 Score:       10/4/2023     4:22 PM   PHQ-9 SCORE   PHQ-9 Total Score MyChart 4 (Minimal depression)   PHQ-9 Total Score 4           Have you ever done Advance Care Planning? (For example, a Health Directive, POLST, or a discussion with a medical provider or your loved ones about your wishes): Yes, advance care planning is on file.       Fall risk  Fallen 2 or more times in the past year?: No  Any fall with injury in the past year?: No    Cognitive Screening   1) Repeat 3 items (Leader, Season, Table)    2) Clock draw: NORMAL  3) 3 item recall: Recalls 3 objects  Results: 3 items recalled: COGNITIVE IMPAIRMENT LESS LIKELY    Mini-CogTM Copyright AIMEE Harris. Licensed by the author for use in [facility]; reprinted with permission (hever@.[facility]). All rights reserved.      Do you have sleep apnea, excessive snoring or daytime drowsiness? : yes    Reviewed and updated as needed this visit by clinical staff   Tobacco  Allergies  Meds          "     Reviewed and updated as needed this visit by Provider                 Social History     Tobacco Use     Smoking status: Never     Smokeless tobacco: Never     Tobacco comments:     2 cigars per month   Substance Use Topics     Alcohol use: Yes     Comment: Alcoholic Drinks/day: occas             10/4/2023     4:18 PM   Alcohol Use   Prescreen: >3 drinks/day or >7 drinks/week? No     Do you have a current opioid prescription? No  Do you use any other controlled substances or medications that are not prescribed by a provider? None    Current providers sharing in care for this patient include:   Patient Care Team:  Wil Larson MD as PCP - General  Wil Larson MD as Assigned PCP    The following health maintenance items are reviewed in Epic and correct as of today:  Health Maintenance   Topic Date Due     ANNUAL REVIEW OF HM ORDERS  Never done     HEPATITIS C SCREENING  Never done     DTAP/TDAP/TD IMMUNIZATION (2 - Td or Tdap) 09/02/2021     MEDICARE ANNUAL WELLNESS VISIT  06/02/2023     INFLUENZA VACCINE (1) 09/01/2023     COVID-19 Vaccine (6 - 2023-24 season) 09/01/2023     FALL RISK ASSESSMENT  10/04/2024     ADVANCE CARE PLANNING  06/02/2027     LIPID  06/07/2027     PHQ-2 (once per calendar year)  Completed     Pneumococcal Vaccine: 65+ Years  Completed     ZOSTER IMMUNIZATION  Completed     IPV IMMUNIZATION  Aged Out     HPV IMMUNIZATION  Aged Out     MENINGITIS IMMUNIZATION  Aged Out     COLORECTAL CANCER SCREENING  Discontinued         Review of Systems   Constitutional:  Negative for chills and fever.   HENT:  Negative for congestion, ear pain, hearing loss and sore throat.    Eyes:  Positive for visual disturbance. Negative for pain.   Respiratory:  Negative for cough and shortness of breath.    Cardiovascular:  Negative for chest pain, palpitations and peripheral edema.   Gastrointestinal:  Negative for abdominal pain, constipation, diarrhea, heartburn, hematochezia and nausea.  "  Genitourinary:  Positive for frequency, impotence and urgency. Negative for dysuria, genital sores, hematuria and penile discharge.   Musculoskeletal:  Positive for arthralgias. Negative for joint swelling and myalgias.   Skin:  Negative for rash.   Neurological:  Positive for weakness and headaches. Negative for dizziness and paresthesias.   Psychiatric/Behavioral:  Positive for mood changes. The patient is nervous/anxious.        OBJECTIVE:   /60 (BP Location: Right arm, Patient Position: Sitting, Cuff Size: Adult Regular)   Pulse 79   Temp 98.1  F (36.7  C)   Resp 16   Ht 1.753 m (5' 9\")   Wt 74.4 kg (164 lb)   SpO2 98%   BMI 24.22 kg/m   Estimated body mass index is 24.22 kg/m  as calculated from the following:    Height as of this encounter: 1.753 m (5' 9\").    Weight as of this encounter: 74.4 kg (164 lb).  Physical Exam    General: Alert, in no distress  Skin: No significant lesion seen.  Eyes/nose/throat: Eyes without scleral icterus, eye movements normal, pupils equal and reactive, oropharynx clear, ears with normal TM's  MSK: Neck with good ROM  Lymphatic: Neck without adenopathy or masses  Endocrine: Thyroid with no nodules to palpation  Pulm: Lungs clear to auscultation bilaterally  Cardiac: Heart with regular rate and rhythm, no murmur or gallop  GI: Abdomen soft, nontender. No palpable enlargement of liver or spleen  MSK: Extremities no tenderness or edema  + Mild venous varicosities both legs and telangiectasias medial both ankles, but no skin breakdown  Neuro: Moves all extremities, without focal weakness  Psych: Alert, normal mental status. 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  He travels a lot, exercises vigorously.    Continues to take testosterone injections about every 2 to 3 weeks.  We are planning to check a testosterone level done at the " midpoint between his injections.  We will also get his routine lab work at that time which will be comprehensive metabolic panel, CBC, TSH for thyroid, lipid panel, and screening PSA which we expect to be 0 since he is status post prostatectomy for about 2 decades.    He plans to get the flu shot new COVID-19 monovalent vaccine sometime this autumn, and probably also the new RSV vaccine.    Insomnia and anxiety, okay to continue to use lower dose zolpidem 5 mg as well has low-dose alprazolam  Today October 4, 2023 we decided to try a lower dose of Lexapro (escitalopram) 10 mg a day, and Dr. Vaca will also investigate trying cognitive behavioral therapy and mindfulness practices such as meditation.    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  Sometimes he finds it difficult to fall asleep, and then he will awaken after a few hours.    I think he is using the Ambien and the alprazolam in a reasonable fashion, but perhaps he would need less of that if we tried something more long-acting for anxiety.    Thus the trial of Lexapro, which can be combined with nonpharmacologic measures of cognitive behavioral therapy and mindfulness practices.    Regarding mindfulness practices, I suggested the book Practical Meditation for Beginners.    I also reminded him of the importance of continuing to exercise regularly, eat healthy diet, keep alcohol consumption and modest levels.     Benign essential hypertension, blood pressures been running a bit on the low side, so he reduced  losartan 25 mg once a day (half of a 50 mg tablet) and hydrochlorothiazide 12.5 mg a day  Hydrochlorothiazide may also be useful because of his history of calcium containing kidney stones,  dropped amlodipine as of June 2, 2022    Venous congestion in the legs.  Dr. Vaca showed me a photograph where he had been sitting for some number of minutes, and his lower legs and feet looked a little bit purple.   He has easily palpable arterial pulses PT and dorsalis pedis both feet, and does not experience any claudication symptoms.  He has a little bit of varicose veins, and I believe the purple discoloration is on the basis of venous congestion, especially since it resolves after he gets up and moves around for a few minutes.    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    Status post lumbar fusion of L3-4 and decompression of L4-5 done at [facility] January 12, 2021 without complications     Chronic cervical radiculopathy  Status post C7-T1 right-sided microdiscectomy, because of recurrence of radiculopathy symptoms with right hand weakness, late April 2022, Dr. Dickson Valdivia at [facility]  February 2023, Dr. Vaca underwent a cervical facet nerve ablation, with good results    With history of 5 neck procedures, fused from C4-C6, he will be getting an epidural injection on the left side March 22, and has muscle atrophy of his left trapezius, uses Celebrex for pain relief     Raynaud's phenomenon  He does not think the amlodipine has particularly helped the Raynaud's, thus stopped it     History of dilation of the ascending aorta, stable appearance on CT angiogram March 30, 2021, no symptoms, and his cardiac auscultation is normal.  I have ordered for Dr. Vaca the updated CT angiogram of the chest    No symptoms to suggest aortic dissection, and I do not hear any aortic valve murmurs.  Consider rechecking his thoracic CTA in 2024    3- CTA  1.  Stable 3.9 cm ascending aortic aneurysm. Thoracic aorta is ectatic. No great vessel stenosis.  2.  Mild upper lobe and right middle lobe cylindrical bronchiectasis.  3.  Minimal coronary artery calcifications     Hyperlipidemia, with no history of cardiovascular events, on high intensity atorvastatin 40 mg a day, with LDL cholesterol under 100 when  checked March 2020. He had a coronary calcification scan with a score less than 100.     Prostate cancer, status post radical prostatectomy in the late 1990s, undetectable PSAs.  His testosterone therapy is being approved by his urologist     Low testosterone, for which he started replacement therapy in January 2021, initially topical, subsequently switched to every 3-day injections of testosterone cypionate 60 mg q3 days.  Manages by Dr Trenton Parham at [facility]  He has been working with Dr. Trenton Parham at [facility] for testosterone replacement therapy.      As of November as of December 2022, it sounds like Clayton's dose is around 100 mg per 2 weeks     6-7-2022  Prostate Specific Antigen Screen 0.00 - 6.50 ug/L <0.10       Status post right total hip arthroplasty May 2019, then with heterotopic bone excision and psoas tendon release March 2020 at [place]     Nephrolithiasis, known to have stones residing in both kidneys, currently asymptomatic, microhematuria has been observed.    I reminded him about the importance of staying well-hydrated.  1 thing we might be giving up by dropping hydrochlorothiazide is that hydrochlorothiazide tends to decrease calcium excretion and is sometimes used to suppress formation of stones.  We decided to keep the hydrochlorothiazide going for that reason.     Glaucoma, on eyedrops     Satisfactory screening colonoscopy December 2017 with sigmoid diverticulosis, no polyps, recheck optional 5 years after that = 2022     Erectile dysfunction, on testosterone replacement rarely  uses high-dose sildenafil.       Vaccinations  Immunization History   Administered Date(s) Administered     COVID-19 Bivalent 12+ (Pfizer) 09/23/2022     COVID-19 MONOVALENT 12+ (Pfizer) 12/28/2020, 01/18/2021, 09/03/2021     COVID-19 Monovalent 12+ (Pfizer 2022) 05/12/2022     Flu 65+ Years 10/18/2019     Flu, Unspecified 09/26/2017     Influenza (H1N1) 11/25/2009     Influenza (High Dose) 3 valent  "vaccine 10/18/2019     Influenza (IIV3) PF 10/15/2011, 09/27/2013     Influenza Vaccine 65+ (FLUAD) 09/07/2020, 09/21/2021, 11/09/2022     Influenza Vaccine >6 months (Alfuria,Fluzone) 09/07/2020     Pneumo Conj 13-V (2010&after) 12/01/2020     Pneumococcal 23 valent 11/27/2013     TDAP (Adacel,Boostrix) 09/02/2011     Zoster recombinant adjuvanted (SHINGRIX) 12/01/2020, 02/19/2021     Zoster vaccine, live 10/27/2013         COUNSELING:  Reviewed preventive health counseling, as reflected in patient instructions       Healthy diet/nutrition        He reports that he has never smoked. He has never used smokeless tobacco.      Appropriate preventive services were discussed with this patient, including applicable screening as appropriate for fall prevention, nutrition, physical activity, Tobacco-use cessation, weight loss and cognition.  Checklist reviewing preventive services available has been given to the patient.    Reviewed patients plan of care and provided an AVS. The Basic Care Plan (routine screening as documented in Health Maintenance) for Clayton meets the Care Plan requirement. This Care Plan has been established and reviewed with the Patient.      MARIA A MERRILL MD  [facility]    Identified Health Risks:  I have reviewed Opioid Use Disorder and Substance Use Disorder risk factors and made any needed referrals. Answers submitted by the patient for this visit:  SKYLAR-7 (Submitted on 10/4/2023)  SKYLAR 7 TOTAL SCORE: 9  Annual Preventive Visit (Submitted on 10/4/2023)  Chief Complaint: Annual Exam:  In general, how would you rate your overall physical health?: good  Frequency of exercise:: 1 day/week  Do you usually eat at least 4 servings of fruit and vegetables a day, include whole grains & fiber, and avoid regularly eating high fat or \"junk\" foods? : Yes  Taking medications regularly:: Yes  Medication side effects:: None  Activities of Daily Living: no assistance needed  Home " safety: no safety concerns identified  Hearing Impairment:: no hearing concerns  In the past 6 months, have you been bothered by leaking of urine?: No  abdominal pain: No  Blood in stool: No  Blood in urine: No  chest pain: No  chills: No  congestion: No  constipation: No  cough: No  diarrhea: No  dizziness: No  ear pain: No  eye pain: No  nervous/anxious: Yes  fever: No  frequency: Yes  genital sores: No  headaches: Yes  hearing loss: No  heartburn: No  arthralgias: Yes  joint swelling: No  peripheral edema: No  mood changes: Yes  myalgias: No  nausea: No  dysuria: No  palpitations: No  Skin sensation changes: No  sore throat: No  urgency: Yes  rash: No  shortness of breath: No  visual disturbance: Yes  weakness: Yes  impotence: Yes  penile discharge: No  In general, how would you rate your overall mental or emotional health?: good  Additional concerns today:: No  Exercise outside of work (Submitted on 10/4/2023)  Chief Complaint: Annual Exam:  Duration of exercise:: 30-45 minutes

## 2023-10-04 NOTE — PATIENT INSTRUCTIONS
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  He travels a lot, exercises vigorously.    Continues to take testosterone injections about every 2 to 3 weeks.  We are planning to check a testosterone level done at the midpoint between his injections.  We will also get his routine lab work at that time which will be comprehensive metabolic panel, CBC, TSH for thyroid, lipid panel, and screening PSA which we expect to be 0 since he is status post prostatectomy for about 2 decades.    He plans to get the flu shot new COVID-19 monovalent vaccine sometime this autumn, and probably also the new RSV vaccine.    Insomnia and anxiety, okay to continue to use lower dose zolpidem 5 mg as well has low-dose alprazolam  Today October 4, 2023 we decided to try a lower dose of Lexapro (escitalopram) 10 mg a day, and Dr. Vaca will also investigate trying cognitive behavioral therapy and mindfulness practices such as meditation.    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  Sometimes he finds it difficult to fall asleep, and then he will awaken after a few hours.    I think he is using the Ambien and the alprazolam in a reasonable fashion, but perhaps he would need less of that if we tried something more long-acting for anxiety.    Thus the trial of Lexapro, which can be combined with nonpharmacologic measures of cognitive behavioral therapy and mindfulness practices.    Regarding mindfulness practices, I suggested the book Practical Meditation for Beginners.    I also reminded him of the importance of continuing to exercise regularly, eat healthy diet, keep alcohol consumption and modest levels.     Benign essential hypertension, blood pressures been running a bit on the low side, so he reduced  losartan 25 mg once a day (half of a 50 mg tablet) and hydrochlorothiazide 12.5 mg a day  Hydrochlorothiazide may  also be useful because of his history of calcium containing kidney stones,  dropped amlodipine as of June 2, 2022    Venous congestion in the legs.  Dr. Vaca showed me a photograph where he had been sitting for some number of minutes, and his lower legs and feet looked a little bit purple.  He has easily palpable arterial pulses PT and dorsalis pedis both feet, and does not experience any claudication symptoms.  He has a little bit of varicose veins, and I believe the purple discoloration is on the basis of venous congestion, especially since it resolves after he gets up and moves around for a few minutes.    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    Status post lumbar fusion of L3-4 and decompression of L4-5 done at [facility] January 12, 2021 without complications     Chronic cervical radiculopathy  Status post C7-T1 right-sided microdiscectomy, because of recurrence of radiculopathy symptoms with right hand weakness, late April 2022, Dr. 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  I have ordered for Dr. Vaca the updated CT angiogram of the chest    No symptoms to suggest aortic dissection, and I do not hear any aortic valve murmurs.  Consider rechecking his thoracic CTA in 2024    3- CTA  1.  Stable 3.9 cm ascending aortic aneurysm. Thoracic aorta is  ectatic. No great vessel stenosis.  2.  Mild upper lobe and right middle lobe cylindrical bronchiectasis.  3.  Minimal coronary artery calcifications     Hyperlipidemia, with no history of cardiovascular events, on high intensity atorvastatin 40 mg a day, with LDL cholesterol under 100 when checked March 2020. He had a coronary calcification scan with a score less than 100.     Prostate cancer, status post radical prostatectomy in the late 1990s, undetectable PSAs.  His testosterone therapy is being approved by his urologist     Low testosterone, for which he started replacement therapy in January 2021, initially topical, subsequently switched to every 3-day injections of testosterone cypionate 60 mg q3 days.  Manages by Dr Trenton Parham at [facility]  He has been working with Dr. Trenton Parham at [facility] for testosterone replacement therapy.      As of November as of December 2022, it sounds like Clayton's dose is around 100 mg per 2 weeks     6-7-2022  Prostate Specific Antigen Screen 0.00 - 6.50 ug/L <0.10       Status post right total hip arthroplasty May 2019, then with heterotopic bone excision and psoas tendon release March 2020 at [place]     Nephrolithiasis, known to have stones residing in both kidneys, currently asymptomatic, microhematuria has been observed.    I reminded him about the importance of staying well-hydrated.  1 thing we might be giving up by dropping hydrochlorothiazide is that hydrochlorothiazide tends to decrease calcium excretion and is sometimes used to suppress formation of stones.  We decided to keep the hydrochlorothiazide going for that reason.     Glaucoma, on eyedrops     Satisfactory screening colonoscopy December 2017 with sigmoid diverticulosis, no polyps, recheck optional 5 years after that = 2022     Erectile dysfunction, on testosterone replacement rarely  uses high-dose sildenafil.       Vaccinations  Immunization History   Administered Date(s) Administered     COVID-19 Bivalent 12+ (Pfizer) 09/23/2022    COVID-19 MONOVALENT 12+ (Pfizer) 12/28/2020, 01/18/2021, 09/03/2021    COVID-19 Monovalent 12+ (Pfizer 2022) 05/12/2022    Flu 65+ Years 10/18/2019    Flu, Unspecified 09/26/2017    Influenza (H1N1) 11/25/2009    Influenza (High Dose) 3 valent vaccine 10/18/2019    Influenza (IIV3) PF 10/15/2011, 09/27/2013    Influenza Vaccine 65+ (FLUAD) 09/07/2020, 09/21/2021, 11/09/2022    Influenza Vaccine >6 months (Alfuria,Fluzone) 09/07/2020    Pneumo Conj 13-V (2010&after) 12/01/2020    Pneumococcal 23 valent 11/27/2013    TDAP (Adacel,Boostrix) 09/02/2011    Zoster recombinant adjuvanted (SHINGRIX) 12/01/2020, 02/19/2021    Zoster vaccine, live 10/27/2013

## 2023-10-13 ENCOUNTER — LAB (OUTPATIENT)
Dept: LAB | Facility: CLINIC | Age: 78
End: 2023-10-13
Payer: COMMERCIAL

## 2023-10-13 DIAGNOSIS — M79.10 MYALGIA: ICD-10-CM

## 2023-10-13 DIAGNOSIS — M79.10 MYALGIA: Primary | ICD-10-CM

## 2023-10-13 DIAGNOSIS — Z12.5 SCREENING PSA (PROSTATE SPECIFIC ANTIGEN): ICD-10-CM

## 2023-10-13 DIAGNOSIS — Z00.00 ROUTINE GENERAL MEDICAL EXAMINATION AT A HEALTH CARE FACILITY: ICD-10-CM

## 2023-10-13 DIAGNOSIS — E78.00 HYPERCHOLESTEROLEMIA: ICD-10-CM

## 2023-10-13 DIAGNOSIS — Z51.81 ENCOUNTER FOR THERAPEUTIC DRUG MONITORING: ICD-10-CM

## 2023-10-13 DIAGNOSIS — E29.1 HYPOGONADISM MALE: ICD-10-CM

## 2023-10-13 LAB
ALBUMIN SERPL BCG-MCNC: 4.3 G/DL (ref 3.5–5.2)
ALP SERPL-CCNC: 78 U/L (ref 40–129)
ALT SERPL W P-5'-P-CCNC: 14 U/L (ref 0–70)
ANION GAP SERPL CALCULATED.3IONS-SCNC: 9 MMOL/L (ref 7–15)
AST SERPL W P-5'-P-CCNC: 36 U/L (ref 0–45)
BILIRUB SERPL-MCNC: 0.5 MG/DL
BUN SERPL-MCNC: 19.2 MG/DL (ref 8–23)
CALCIUM SERPL-MCNC: 9.2 MG/DL (ref 8.8–10.2)
CHLORIDE SERPL-SCNC: 103 MMOL/L (ref 98–107)
CHOLEST SERPL-MCNC: 180 MG/DL
CREAT SERPL-MCNC: 1 MG/DL (ref 0.67–1.17)
DEPRECATED HCO3 PLAS-SCNC: 28 MMOL/L (ref 22–29)
EGFRCR SERPLBLD CKD-EPI 2021: 78 ML/MIN/1.73M2
ERYTHROCYTE [DISTWIDTH] IN BLOOD BY AUTOMATED COUNT: 13.1 % (ref 10–15)
ERYTHROCYTE [SEDIMENTATION RATE] IN BLOOD BY WESTERGREN METHOD: 4 MM/HR (ref 0–20)
GLUCOSE SERPL-MCNC: 100 MG/DL (ref 70–99)
HCT VFR BLD AUTO: 45.6 % (ref 40–53)
HDLC SERPL-MCNC: 72 MG/DL
HGB BLD-MCNC: 15.1 G/DL (ref 13.3–17.7)
LDLC SERPL CALC-MCNC: 92 MG/DL
MCH RBC QN AUTO: 31.5 PG (ref 26.5–33)
MCHC RBC AUTO-ENTMCNC: 33.1 G/DL (ref 31.5–36.5)
MCV RBC AUTO: 95 FL (ref 78–100)
NONHDLC SERPL-MCNC: 108 MG/DL
PLATELET # BLD AUTO: 259 10E3/UL (ref 150–450)
POTASSIUM SERPL-SCNC: 4 MMOL/L (ref 3.4–5.3)
PROT SERPL-MCNC: 6.3 G/DL (ref 6.4–8.3)
PSA SERPL DL<=0.01 NG/ML-MCNC: 0.11 NG/ML (ref 0–6.5)
RBC # BLD AUTO: 4.8 10E6/UL (ref 4.4–5.9)
SODIUM SERPL-SCNC: 140 MMOL/L (ref 135–145)
TRIGL SERPL-MCNC: 82 MG/DL
TSH SERPL DL<=0.005 MIU/L-ACNC: 2.89 UIU/ML (ref 0.3–4.2)
WBC # BLD AUTO: 7.6 10E3/UL (ref 4–11)

## 2023-10-13 PROCEDURE — 36415 COLL VENOUS BLD VENIPUNCTURE: CPT

## 2023-10-13 PROCEDURE — 85652 RBC SED RATE AUTOMATED: CPT

## 2023-10-13 PROCEDURE — 85027 COMPLETE CBC AUTOMATED: CPT

## 2023-10-13 PROCEDURE — 80061 LIPID PANEL: CPT

## 2023-10-13 PROCEDURE — 84443 ASSAY THYROID STIM HORMONE: CPT

## 2023-10-13 PROCEDURE — 84403 ASSAY OF TOTAL TESTOSTERONE: CPT

## 2023-10-13 PROCEDURE — 80053 COMPREHEN METABOLIC PANEL: CPT

## 2023-10-13 PROCEDURE — G0103 PSA SCREENING: HCPCS

## 2023-10-18 LAB — TESTOST SERPL-MCNC: 636 NG/DL (ref 240–950)

## 2023-10-19 DIAGNOSIS — I10 ESSENTIAL HYPERTENSION: ICD-10-CM

## 2023-10-19 RX ORDER — LOSARTAN POTASSIUM 50 MG/1
TABLET ORAL
Qty: 90 TABLET | Refills: 1 | Status: SHIPPED | OUTPATIENT
Start: 2023-10-19 | End: 2024-07-08

## 2023-10-19 RX ORDER — HYDROCHLOROTHIAZIDE 12.5 MG/1
CAPSULE ORAL
Qty: 90 CAPSULE | Refills: 2 | Status: SHIPPED | OUTPATIENT
Start: 2023-10-19 | End: 2024-03-01

## 2023-10-20 ENCOUNTER — HOSPITAL ENCOUNTER (OUTPATIENT)
Dept: CT IMAGING | Facility: CLINIC | Age: 78
Discharge: HOME | End: 2023-10-20
Attending: INTERNAL MEDICINE | Admitting: INTERNAL MEDICINE
Payer: COMMERCIAL

## 2023-10-20 DIAGNOSIS — I71.21 ANEURYSM OF ASCENDING AORTA WITHOUT RUPTURE (H): ICD-10-CM

## 2023-10-20 PROCEDURE — 250N000011 HC RX IP 250 OP 636: Mod: JZ | Performed by: INTERNAL MEDICINE

## 2023-10-20 PROCEDURE — 71275 CT ANGIOGRAPHY CHEST: CPT

## 2023-10-20 RX ORDER — IOPAMIDOL 755 MG/ML
90 INJECTION, SOLUTION INTRAVASCULAR ONCE
Status: COMPLETED | OUTPATIENT
Start: 2023-10-20 | End: 2023-10-20

## 2023-10-20 RX ADMIN — IOPAMIDOL 90 ML: 755 INJECTION, SOLUTION INTRAVENOUS at 15:40

## 2024-02-16 ENCOUNTER — TRANSFERRED RECORDS (OUTPATIENT)
Dept: HEALTH INFORMATION MANAGEMENT | Facility: CLINIC | Age: 79
End: 2024-02-16
Payer: COMMERCIAL

## 2024-03-01 ENCOUNTER — OFFICE VISIT (OUTPATIENT)
Dept: SURGERY | Facility: CLINIC | Age: 79
End: 2024-03-01
Payer: COMMERCIAL

## 2024-03-01 VITALS
WEIGHT: 264.3 LBS | SYSTOLIC BLOOD PRESSURE: 116 MMHG | HEIGHT: 69 IN | BODY MASS INDEX: 39.14 KG/M2 | DIASTOLIC BLOOD PRESSURE: 62 MMHG

## 2024-03-01 DIAGNOSIS — R10.31 RIGHT GROIN PAIN: Primary | ICD-10-CM

## 2024-03-01 PROCEDURE — 99203 OFFICE O/P NEW LOW 30 MIN: CPT | Performed by: SPECIALIST

## 2024-03-01 NOTE — LETTER
3/1/2024         RE: Clayton Vaca  [address] [facility] [address]        Dear Colleague,    Thank you for referring your patient, Clayton Vaca, to the [facility] [place]. Please see a copy of my visit note below.        [facility] Surgery Consult    HPI:    78 year old year old male who I have been consulted by Wil Larson for evaluation of Consult For (Right ing hernia, previous repair. Pt has been having pain and wondering if it need to be revised)  Patient is here for consult he is having pain in his right groin he had a hernia pair done there in the past by Dr. Bin Cordoba.  The question is whether he has a recurrence or issue there is pain can intermittent Souther all time seems to be can almost zinger, some nervelike pain he has not noticed a bulge but he is worried about recurrence here for consultation    Allergies:Demerol [meperidine], Morphine (pf) [morphine], and Nitrous oxide    Past Medical History:   Diagnosis Date     Agatston coronary artery calcium score less than 100 09/2017    score 90  2017     Cancer (H)     cancer, 15 yrs ago      Gastric ulcer      High cholesterol      Hyperlipidemia      Hypertension      Kidney stone      PONV (postoperative nausea and vomiting)     nausea with Nitrous     Thoracic ascending aortic aneurysm (H24) 09/2017    4.1 cm on ct       Past Surgical History:   Procedure Laterality Date     CERVICAL SPINE SURGERY      4 neck     COMBINED CYSTOSCOPY, INSERT STENT URETER(S) Bilateral 4/25/2016    Procedure: CYSTOSCOPY, URETEROSCOPIC LASER LITHOTRIPSY, STENT INSERTION;  Surgeon: Lonnie Rosenbaum MD;  Location: [facility];  Service:      INGUINAL HERNIA REPAIR Bilateral      SHOULDER SURGERY      2 right, one left     URETEROSCOPY         CURRENT MEDS:  Current Outpatient Medications   Medication     ALPRAZolam (XANAX) 0.5 MG tablet     atorvastatin (LIPITOR) 40 MG tablet     celecoxib (CELEBREX)  "200 MG capsule     hydrochlorothiazide (HYDRODIURIL) 12.5 MG tablet     losartan (COZAAR) 50 MG tablet     zolpidem (AMBIEN) 5 MG tablet     No current facility-administered medications for this visit.       Family History   Problem Relation Age of Onset     Cerebrovascular Disease Mother      Coronary Artery Disease Maternal Uncle      Heart Disease Maternal Uncle      Stomach Cancer Father      Muscle Cancer Brother         Rhaddomyosarcoma     Urolithiasis No family hx of         reports that he has never smoked. He has never used smokeless tobacco. He reports current alcohol use. He reports that he does not use drugs.    Review of Systems:  Negative except occasional pain right groin.  He had bilateral pares open repair on the left side done by Dr. Bin Cobos on the right by Dr. Jose Elias ordaz.  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      OBJECTIVE:  Vitals:    03/01/24 1340   BP: 116/62   Weight: 119.9 kg (264 lb 4.8 oz)   Height: 1.753 m (5' 9\")     Body mass index is 39.03 kg/m .    EXAM:  GENERAL: This is a well-developed 78 year old male who appears his stated age  HEAD: normocephalic  HEENT: Pupils equal and reactive bilaterally  MOUTH: mucus membranes intact  CARDIAC: RRR without murmur  CHEST/LUNG:  Clear to auscultation  ABDOMEN: Soft, nontender, nondistended, no masses, no recurrence hernia peers are intact bilaterally.  EXTREMITIES: Grossly normal, warm,   NEUROLOGIC: Focally intact, nonfocal  INTEGUMENT: No open lesions or ulcers  VASCULAR: Pulses intact, symmetrical upper and lower extremities.        LABS:  Lab Results   Component Value Date    WBC 7.6 10/13/2023    HGB 15.1 10/13/2023    HCT 45.6 10/13/2023    MCV 95 10/13/2023     10/13/2023     Lab Results   Component Value Date    ALT 14 10/13/2023    AST 36 10/13/2023    ALKPHOS 78 10/13/2023        Assessment/Plan: "     I think he most likely has some neuralgic pain from probably a nerve tethering from his repair.    No recurrence is noted.  I discussed options I think he has a few 1 you can just continue to Watch and see how it goes #2 you could try therapy or physical therapy sometimes breaking a supple make a difference usually this is something is acute phase issue and usually resolves a few months after surgery but he is out of few years.  Other option would be to see a pain clinic for an injection this is an option and works usually what I found the best at this time point he is not having enough issues to to wanted do that.    Discussed and answered questions him today.  Available for any other issues arise.    No follow-ups on file.    Arthur Sears MD  General Surgery [phone]  Vascular Surgery [phone]        Again, thank you for allowing me to participate in the care of your patient.        Sincerely,        Arthur Sears MD

## 2024-03-07 PROBLEM — E66.01 CLASS 2 SEVERE OBESITY DUE TO EXCESS CALORIES WITH SERIOUS COMORBIDITY IN ADULT (H): Status: ACTIVE | Noted: 2024-03-07

## 2024-03-07 PROBLEM — E66.812 CLASS 2 SEVERE OBESITY DUE TO EXCESS CALORIES WITH SERIOUS COMORBIDITY IN ADULT (H): Status: ACTIVE | Noted: 2024-03-07

## 2024-03-07 NOTE — PROGRESS NOTES
[facility] Surgery Consult    HPI:    78 year old year old male who I have been consulted by Wil Larson for evaluation of Consult For (Right ing hernia, previous repair. Pt has been having pain and wondering if it need to be revised)  Patient is here for consult he is having pain in his right groin he had a hernia pair done there in the past by Dr. Bin Cordoba.  The question is whether he has a recurrence or issue there is pain can intermittent Souther all time seems to be can almost zinger, some nervelike pain he has not noticed a bulge but he is worried about recurrence here for consultation    Allergies:Demerol [meperidine], Morphine (pf) [morphine], and Nitrous oxide    Past Medical History:   Diagnosis Date    Agatston coronary artery calcium score less than 100 09/2017    score 90  2017    Cancer (H)     cancer, 15 yrs ago     Gastric ulcer     High cholesterol     Hyperlipidemia     Hypertension     Kidney stone     PONV (postoperative nausea and vomiting)     nausea with Nitrous    Thoracic ascending aortic aneurysm (H24) 09/2017    4.1 cm on ct       Past Surgical History:   Procedure Laterality Date    CERVICAL SPINE SURGERY      4 neck    COMBINED CYSTOSCOPY, INSERT STENT URETER(S) Bilateral 4/25/2016    Procedure: CYSTOSCOPY, URETEROSCOPIC LASER LITHOTRIPSY, STENT INSERTION;  Surgeon: Lonnie Rosenbaum MD;  Location: [facility];  Service:     INGUINAL HERNIA REPAIR Bilateral     SHOULDER SURGERY      2 right, one left    URETEROSCOPY         CURRENT MEDS:  Current Outpatient Medications   Medication    ALPRAZolam (XANAX) 0.5 MG tablet    atorvastatin (LIPITOR) 40 MG tablet    celecoxib (CELEBREX) 200 MG capsule    hydrochlorothiazide (HYDRODIURIL) 12.5 MG tablet    losartan (COZAAR) 50 MG tablet    zolpidem (AMBIEN) 5 MG tablet     No current facility-administered medications for this visit.       Family History   Problem Relation Age of Onset    Cerebrovascular Disease Mother  "    Coronary Artery Disease Maternal Uncle     Heart Disease Maternal Uncle     Stomach Cancer Father     Muscle Cancer Brother         Rhaddomyosarcoma    Urolithiasis No family hx of         reports that he has never smoked. He has never used smokeless tobacco. He reports current alcohol use. He reports that he does not use drugs.    Review of Systems:  Negative except occasional pain right groin.  He had bilateral pares open repair on the left side done by Dr. Bin Cobos on the right by Dr. Jose Elias ordaz.  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      OBJECTIVE:  Vitals:    03/01/24 1340   BP: 116/62   Weight: 119.9 kg (264 lb 4.8 oz)   Height: 1.753 m (5' 9\")     Body mass index is 39.03 kg/m .    EXAM:  GENERAL: This is a well-developed 78 year old male who appears his stated age  HEAD: normocephalic  HEENT: Pupils equal and reactive bilaterally  MOUTH: mucus membranes intact  CARDIAC: RRR without murmur  CHEST/LUNG:  Clear to auscultation  ABDOMEN: Soft, nontender, nondistended, no masses, no recurrence hernia peers are intact bilaterally.  EXTREMITIES: Grossly normal, warm,   NEUROLOGIC: Focally intact, nonfocal  INTEGUMENT: No open lesions or ulcers  VASCULAR: Pulses intact, symmetrical upper and lower extremities.        LABS:  Lab Results   Component Value Date    WBC 7.6 10/13/2023    HGB 15.1 10/13/2023    HCT 45.6 10/13/2023    MCV 95 10/13/2023     10/13/2023     Lab Results   Component Value Date    ALT 14 10/13/2023    AST 36 10/13/2023    ALKPHOS 78 10/13/2023        Assessment/Plan:     I think he most likely has some neuralgic pain from probably a nerve tethering from his repair.    No recurrence is noted.  I discussed options I think he has a few 1 you can just continue to Watch and see how it goes #2 you could try therapy or physical therapy sometimes breaking a supple " make a difference usually this is something is acute phase issue and usually resolves a few months after surgery but he is out of few years.  Other option would be to see a pain clinic for an injection this is an option and works usually what I found the best at this time point he is not having enough issues to to wanted do that.    Discussed and answered questions him today.  Available for any other issues arise.    No follow-ups on file.    Arthur Sears MD  General Surgery [phone]  Vascular Surgery [phone]

## 2024-03-18 DIAGNOSIS — F51.04 CHRONIC INSOMNIA: ICD-10-CM

## 2024-03-18 RX ORDER — ZOLPIDEM TARTRATE 5 MG/1
5 TABLET ORAL
Qty: 60 TABLET | Refills: 0 | Status: SHIPPED | OUTPATIENT
Start: 2024-03-18

## 2024-07-08 DIAGNOSIS — E78.00 HYPERCHOLESTEREMIA: ICD-10-CM

## 2024-07-08 DIAGNOSIS — I25.10 CORONARY ARTERY DISEASE INVOLVING NATIVE HEART WITHOUT ANGINA PECTORIS, UNSPECIFIED VESSEL OR LESION TYPE: ICD-10-CM

## 2024-07-08 DIAGNOSIS — I10 ESSENTIAL HYPERTENSION: ICD-10-CM

## 2024-07-08 RX ORDER — ATORVASTATIN CALCIUM 40 MG/1
40 TABLET, FILM COATED ORAL DAILY
Qty: 100 TABLET | Refills: 1 | Status: SHIPPED | OUTPATIENT
Start: 2024-07-08

## 2024-07-08 RX ORDER — LOSARTAN POTASSIUM 50 MG/1
50 TABLET ORAL DAILY
Qty: 100 TABLET | Refills: 1 | Status: SHIPPED | OUTPATIENT
Start: 2024-07-08

## 2024-07-08 NOTE — TELEPHONE ENCOUNTER
Patient has [facility] coverage and  is eligible to get certain prescriptions as a 100-day supply.      Prescriptions updated to 100-day supply: atorvastatin, losartan     Ryan CallwoayD, [facility]  Retail Pharmacy Specialist  [phone]

## 2024-07-17 ENCOUNTER — TELEPHONE (OUTPATIENT)
Dept: INTERNAL MEDICINE | Facility: CLINIC | Age: 79
End: 2024-07-17
Payer: COMMERCIAL

## 2024-07-17 NOTE — TELEPHONE ENCOUNTER
Left VM for Pt to call back and schedule his AWV.  Per insurance, Pt is allowed 1 per calendar year so if he calls back, please schedule at the soonest available.    Prakash White

## 2024-07-19 DIAGNOSIS — I10 ESSENTIAL HYPERTENSION: ICD-10-CM

## 2024-07-19 RX ORDER — HYDROCHLOROTHIAZIDE 12.5 MG/1
CAPSULE ORAL
Qty: 90 CAPSULE | Refills: 1 | Status: SHIPPED | OUTPATIENT
Start: 2024-07-19

## 2024-09-04 ENCOUNTER — PATIENT OUTREACH (OUTPATIENT)
Dept: CARE COORDINATION | Facility: CLINIC | Age: 79
End: 2024-09-04
Payer: COMMERCIAL

## 2024-09-18 ENCOUNTER — PATIENT OUTREACH (OUTPATIENT)
Dept: CARE COORDINATION | Facility: CLINIC | Age: 79
End: 2024-09-18
Payer: COMMERCIAL

## 2024-10-09 ENCOUNTER — OFFICE VISIT (OUTPATIENT)
Dept: INTERNAL MEDICINE | Facility: CLINIC | Age: 79
End: 2024-10-09
Payer: COMMERCIAL

## 2024-10-09 VITALS
TEMPERATURE: 97.8 F | HEART RATE: 75 BPM | WEIGHT: 167 LBS | HEIGHT: 69 IN | OXYGEN SATURATION: 98 % | RESPIRATION RATE: 16 BRPM | BODY MASS INDEX: 24.73 KG/M2 | DIASTOLIC BLOOD PRESSURE: 70 MMHG | SYSTOLIC BLOOD PRESSURE: 118 MMHG

## 2024-10-09 DIAGNOSIS — E78.00 HYPERCHOLESTEROLEMIA: ICD-10-CM

## 2024-10-09 DIAGNOSIS — Z12.5 SCREENING PSA (PROSTATE SPECIFIC ANTIGEN): ICD-10-CM

## 2024-10-09 DIAGNOSIS — F51.04 CHRONIC INSOMNIA: ICD-10-CM

## 2024-10-09 DIAGNOSIS — I10 ESSENTIAL HYPERTENSION: ICD-10-CM

## 2024-10-09 DIAGNOSIS — R06.09 DOE (DYSPNEA ON EXERTION): ICD-10-CM

## 2024-10-09 DIAGNOSIS — F41.9 ANXIETY: ICD-10-CM

## 2024-10-09 DIAGNOSIS — R07.89 CHEST PRESSURE: Primary | ICD-10-CM

## 2024-10-09 DIAGNOSIS — Z00.00 ROUTINE GENERAL MEDICAL EXAMINATION AT A HEALTH CARE FACILITY: ICD-10-CM

## 2024-10-09 LAB
ALBUMIN SERPL BCG-MCNC: 4.2 G/DL (ref 3.5–5.2)
ALP SERPL-CCNC: 91 U/L (ref 40–150)
ALT SERPL W P-5'-P-CCNC: 15 U/L (ref 0–70)
ANION GAP SERPL CALCULATED.3IONS-SCNC: 11 MMOL/L (ref 7–15)
AST SERPL W P-5'-P-CCNC: 35 U/L (ref 0–45)
BILIRUB SERPL-MCNC: 0.5 MG/DL
BUN SERPL-MCNC: 23.6 MG/DL (ref 8–23)
CALCIUM SERPL-MCNC: 9.3 MG/DL (ref 8.8–10.4)
CHLORIDE SERPL-SCNC: 106 MMOL/L (ref 98–107)
CREAT SERPL-MCNC: 1.02 MG/DL (ref 0.67–1.17)
EGFRCR SERPLBLD CKD-EPI 2021: 76 ML/MIN/1.73M2
ERYTHROCYTE [DISTWIDTH] IN BLOOD BY AUTOMATED COUNT: 12.6 % (ref 10–15)
GLUCOSE SERPL-MCNC: 103 MG/DL (ref 70–99)
HCO3 SERPL-SCNC: 25 MMOL/L (ref 22–29)
HCT VFR BLD AUTO: 41.6 % (ref 40–53)
HGB BLD-MCNC: 14.1 G/DL (ref 13.3–17.7)
MCH RBC QN AUTO: 31.2 PG (ref 26.5–33)
MCHC RBC AUTO-ENTMCNC: 33.9 G/DL (ref 31.5–36.5)
MCV RBC AUTO: 92 FL (ref 78–100)
PLATELET # BLD AUTO: 257 10E3/UL (ref 150–450)
POTASSIUM SERPL-SCNC: 3.9 MMOL/L (ref 3.4–5.3)
PROT SERPL-MCNC: 6.4 G/DL (ref 6.4–8.3)
PSA SERPL DL<=0.01 NG/ML-MCNC: 0.12 NG/ML (ref 0–6.5)
RBC # BLD AUTO: 4.52 10E6/UL (ref 4.4–5.9)
SODIUM SERPL-SCNC: 142 MMOL/L (ref 135–145)
TSH SERPL DL<=0.005 MIU/L-ACNC: 1.66 UIU/ML (ref 0.3–4.2)
WBC # BLD AUTO: 7.7 10E3/UL (ref 4–11)

## 2024-10-09 PROCEDURE — 99214 OFFICE O/P EST MOD 30 MIN: CPT | Performed by: INTERNAL MEDICINE

## 2024-10-09 PROCEDURE — G0103 PSA SCREENING: HCPCS | Performed by: INTERNAL MEDICINE

## 2024-10-09 PROCEDURE — 36415 COLL VENOUS BLD VENIPUNCTURE: CPT | Performed by: INTERNAL MEDICINE

## 2024-10-09 PROCEDURE — 85027 COMPLETE CBC AUTOMATED: CPT | Performed by: INTERNAL MEDICINE

## 2024-10-09 PROCEDURE — 84443 ASSAY THYROID STIM HORMONE: CPT | Performed by: INTERNAL MEDICINE

## 2024-10-09 PROCEDURE — 80053 COMPREHEN METABOLIC PANEL: CPT | Performed by: INTERNAL MEDICINE

## 2024-10-09 PROCEDURE — G2211 COMPLEX E/M VISIT ADD ON: HCPCS | Performed by: INTERNAL MEDICINE

## 2024-10-09 RX ORDER — ALPRAZOLAM 0.5 MG
0.5 TABLET ORAL
Qty: 30 TABLET | Refills: 1 | Status: SHIPPED | OUTPATIENT
Start: 2024-10-09

## 2024-10-09 RX ORDER — ZOLPIDEM TARTRATE 5 MG/1
5 TABLET ORAL
Qty: 60 TABLET | Refills: 0 | Status: SHIPPED | OUTPATIENT
Start: 2024-10-09

## 2024-10-09 NOTE — PATIENT INSTRUCTIONS
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    Will do routine lab work this afternoon of comprehensive metabolic panel, CBC, TSH for thyroid, PSA for prostate.  Hemoglobin A1c test to screen for diabetes    Exertional dyspnea and chest pressure, with some risk factors for CAD  He does have a hunting trip coming up in a few weeks, and has noticed a little bit of dyspnea on exertion and maybe some chest pressure.    Because he does have cardiovascular risk factors in terms of age, some coronary calcification, blood pressure which is treated and lipids that are also treated, I think we can do a conventional treadmill test without nuclear imaging, because my pretest probability for significant coronary disease is low.    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  He is now 79 years old, and maintains an active lifestyle.    Lateral (horizontal) Diplopia  Tends to happen with side gaze  Dr. Mei does not have diabetes.  On examination October 9, 2024, his eyes seem to move in conjugate fashion as I do tracking the finger testing.  The, but he did note some diplopia on far lateral gaze.  That might be referable to the 6th cranial nerve (abducens)    I suggested that Dr. Franco see his ophthalmologist for exam.  At the moment I am not suspecting any central nervous system problem.    Insomnia and anxiety, okay to continue to use lower dose zolpidem 5 mg as well has low-dose alprazolam    October 2023 tred Lexapro (escitalopram) 10 mg a day, made him excessively sleepy so he stopped it      Benign essential hypertension, blood pressures been running a bit on the low side, so he reduced  losartan 25 mg once a day (half of a 50 mg tablet) and hydrochlorothiazide 12.5 mg a day  Hydrochlorothiazide may also be useful because of his history of calcium containing kidney stones,  dropped amlodipine as of June 2, 2022    BP Readings from Last 6 Encounters:   10/09/24 118/70   03/01/24 116/62   10/04/23 114/60    12/02/22 100/62   06/02/22 110/64   06/03/21 104/70     Venous congestion in the legs.  Dr. Vaca showed me a photograph where he had been sitting for some number of minutes, and his lower legs and feet looked a little bit purple.  He has easily palpable arterial pulses PT and dorsalis pedis both feet, and does not experience any claudication symptoms.  He has a little bit of varicose veins, and I believe the purple discoloration is on the basis of venous congestion, especially since it resolves after he gets up and moves around for a few minutes.     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     Status post lumbar fusion of L3-4 and decompression of L4-5 done at [facility] January 12, 2021 without complications     Chronic cervical radiculopathy  Status post C7-T1 right-sided microdiscectomy, because of recurrence of radiculopathy symptoms with right hand weakness, late April 2022, Dr. Dickson Valdivia at [facility]  February 2023, Dr. Vaca underwent a cervical facet nerve ablation, with good results     With history of 5 neck procedures, fused from C4-C6, he will be getting an epidural injection on the left side March 22, and has muscle atrophy of his left trapezius, uses Celebrex for pain relief     Raynaud's phenomenon  He does not think the amlodipine has particularly helped the Raynaud's, thus stopped it     History of dilation of the ascending aorta, stable appearance on CT angiogram March 30, 2021, no symptoms, and his cardiac auscultation is normal.  I have ordered for Dr. Vaca the updated CT angiogram of the chest  No symptoms to suggest aortic dissection, and I do not hear any aortic valve murmurs.  Consider rechecking his thoracic CTA in 2024     Narrative & Impression  EXAM: CTA CHEST WITH CONTRAST  LOCATION: [facility]  DATE: 10/20/2023  INDICATION:  Rechecked ascending aortic dilation, measured 3.9 cm March 2021  COMPARISON: CT angiogram chest dated 03/30/2021  CT ANGIOGRAM CHEST: Unchanged dilatation of the ascending aorta measuring 3.9 x 3.9 cm on series 4, image 87). Normal caliber aortic arch and descending thoracic aorta. Classic branching pattern of the aortic arch. The visualized great vessels are patent. Minimal aortic atherosclerosis.  LUNGS AND PLEURA: No significant finding.  MEDIASTINUM/AXILLAE: No significant finding.  CORONARY ARTERY CALCIFICATION: Mild.  UPPER ABDOMEN: Hepatic steatosis. Mild prominence of the left adrenal, similar to the prior scan.  MUSCULOSKELETAL: Partially imaged lower cervical spine fixation hardware. Degenerative changes and anterior osteophyte formation involving the thoracic spine.                                               IMPRESSION:  1.  Dilatation of the ascending aorta measuring 3.9 cm, unchanged since prior study dated 03/30/2021.      Hyperlipidemia, with no history of cardiovascular events, on high intensity atorvastatin 40 mg a day, with LDL cholesterol under 100 when checked March 2020. He had a coronary calcification scan with a score less than 100.     Prostate cancer, status post radical prostatectomy in the late 1990s, barely detectable PSA of 0.11 measured October 2023 at which time he was taking testosterone  His testosterone therapy is being approved by his urologist  10-  Prostate Specific Antigen Screen  0.00 - 6.50 ng/mL 0.11     Low testosterone, for which he started replacement therapy in January 2021, initially topical, subsequently switched to every 3-day injections of testosterone cypionate 60 mg q3 days.  Clayton told me October 2024 that he has been using the testosterone only irregularly, basically tapering off of the    He has been working with Dr. Trenton Parham at [facility] for testosterone replacement therapy.   As of November as of December 2022, it sounds like Clayton's dose is  around 100 mg per 2 weeks     6-7-2022  Prostate Specific Antigen Screen 0.00 - 6.50 ug/L <0.10       Status post right total hip arthroplasty May 2019, then with heterotopic bone excision and psoas tendon release March 2020 at [place]     Nephrolithiasis, known to have stones residing in both kidneys, currently asymptomatic, microhematuria has been observed.    I reminded him about the importance of staying well-hydrated.  1 thing we might be giving up by dropping hydrochlorothiazide is that hydrochlorothiazide tends to decrease calcium excretion and is sometimes used to suppress formation of stones.  We decided to keep the hydrochlorothiazide going for that reason.     Glaucoma, on eyedrops     Satisfactory screening colonoscopy December 2017 with sigmoid diverticulosis, no polyps, recheck optional 5 years after that = 2022     Erectile dysfunction, on testosterone replacement rarely  uses high-dose sildenafil.      Immunization History   Administered Date(s) Administered    COVID-19 12+ (Pfizer) 10/24/2023, 10/04/2024    COVID-19 Bivalent 12+ (Pfizer) 09/23/2022    COVID-19 MONOVALENT 12+ (Pfizer) 12/28/2020, 01/18/2021, 09/03/2021    COVID-19 Monovalent 12+ (Pfizer 2022) 05/12/2022    Flu 65+ (Fluad) 10/18/2019, 10/04/2024    Flu, Unspecified 09/26/2017    Influenza (H1N1) 11/25/2009    Influenza (High Dose) Trivalent,PF (Fluzone) 10/18/2019    Influenza (IIV3) PF 10/15/2011, 09/27/2013    Influenza Vaccine 65+ (FLUAD) 09/07/2020, 09/21/2021, 11/09/2022, 10/24/2023    Influenza Vaccine >6 months,quad, PF 09/07/2020    Pneumo Conj 13-V (2010&after) 12/01/2020    Pneumococcal 23 valent 11/27/2013    RSV Vaccine (Abrysvo) 10/24/2023    TDAP (Adacel,Boostrix) 09/02/2011    Zoster recombinant adjuvanted (SHINGRIX) 12/01/2020, 02/19/2021    Zoster vaccine, live 10/27/2013

## 2024-10-09 NOTE — PROGRESS NOTES
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    Will do routine lab work this afternoon of comprehensive metabolic panel, CBC, TSH for thyroid, PSA for prostate.  Hemoglobin A1c test to screen for diabetes    Exertional dyspnea and chest pressure, with some risk factors for CAD  He does have a hunting trip coming up in a few weeks, and has noticed a little bit of dyspnea on exertion and maybe some chest pressure.    Because he does have cardiovascular risk factors in terms of age, some coronary calcification, blood pressure which is treated and lipids that are also treated, I think we can do a conventional treadmill test without nuclear imaging, because my pretest probability for significant coronary disease is low.    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  He is now 79 years old, and maintains an active lifestyle.    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  On examination October 9, 2024, his eyes seem to move in conjugate fashion as I do tracking the finger testing.  The, but he did note some diplopia on far lateral gaze.  That might be referable to the 6th cranial nerve (abducens)    I suggested that Dr. Franco see his ophthalmologist for exam.  At the moment I am not suspecting any central nervous system problem.    Insomnia and anxiety, okay to continue to use lower dose zolpidem 5 mg as well has low-dose alprazolam    October 2023 tred Lexapro (escitalopram) 10 mg a day, made him excessively sleepy so he stopped it      Benign essential hypertension, blood pressures been running a bit on the low side, so he reduced  losartan 25 mg once a day  (half of a 50 mg tablet) and hydrochlorothiazide 12.5 mg a day  Hydrochlorothiazide may also be useful because of his history of calcium containing kidney stones,  dropped amlodipine as of June 2, 2022    BP Readings from Last 6 Encounters:   10/09/24 118/70   03/01/24 116/62   10/04/23 114/60   12/02/22 100/62   06/02/22 110/64   06/03/21 104/70     Venous congestion in the legs.  Dr. Vaca showed me a photograph where he had been sitting for some number of minutes, and his lower legs and feet looked a little bit purple.  He has easily palpable arterial pulses PT and dorsalis pedis both feet, and does not experience any claudication symptoms.  He has a little bit of varicose veins, and I believe the purple discoloration is on the basis of venous congestion, especially since it resolves after he gets up and moves around for a few minutes.     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     Status post lumbar fusion of L3-4 and decompression of L4-5 done at [facility] January 12, 2021 without complications     Chronic cervical radiculopathy  Status post C7-T1 right-sided microdiscectomy, because of recurrence of radiculopathy symptoms with right hand weakness, late April 2022, Dr. Dickson Valdivia at [facility]  February 2023, Dr. Vaca underwent a cervical facet nerve ablation, with good results     With history of 5 neck procedures, fused from C4-C6, he will be getting an epidural injection on the left side March 22, and has muscle atrophy of his left trapezius, uses Celebrex for pain relief     Raynaud's phenomenon  He does not think the amlodipine has particularly helped the Raynaud's, thus stopped it     History of dilation of the ascending aorta, stable appearance on CT angiogram March 30, 2021, no symptoms, and his cardiac auscultation is normal.  I have ordered for Dr. Vaca the updated CT  angiogram of the chest  No symptoms to suggest aortic dissection, and I do not hear any aortic valve murmurs.  Consider rechecking his thoracic CTA in 2024     Narrative & Impression  EXAM: CTA CHEST WITH CONTRAST  LOCATION: [facility]  DATE: 10/20/2023  INDICATION: Rechecked ascending aortic dilation, measured 3.9 cm March 2021  COMPARISON: CT angiogram chest dated 03/30/2021  CT ANGIOGRAM CHEST: Unchanged dilatation of the ascending aorta measuring 3.9 x 3.9 cm on series 4, image 87). Normal caliber aortic arch and descending thoracic aorta. Classic branching pattern of the aortic arch. The visualized great vessels are patent. Minimal aortic atherosclerosis.  LUNGS AND PLEURA: No significant finding.  MEDIASTINUM/AXILLAE: No significant finding.  CORONARY ARTERY CALCIFICATION: Mild.  UPPER ABDOMEN: Hepatic steatosis. Mild prominence of the left adrenal, similar to the prior scan.  MUSCULOSKELETAL: Partially imaged lower cervical spine fixation hardware. Degenerative changes and anterior osteophyte formation involving the thoracic spine.                                               IMPRESSION:  1.  Dilatation of the ascending aorta measuring 3.9 cm, unchanged since prior study dated 03/30/2021.     Hyperlipidemia, with no history of cardiovascular events, on high intensity atorvastatin 40 mg a day, with LDL cholesterol under 100 when checked March 2020. He had a coronary calcification scan with a score less than 100.     Prostate cancer, status post radical prostatectomy in the late 1990s, barely detectable PSA of 0.11 measured October 2023 at which time he was taking testosterone  His testosterone therapy is being approved by his urologist  10-  Prostate Specific Antigen Screen  0.00 - 6.50 ng/mL 0.11     Low testosterone, for which he started replacement therapy in January 2021, initially topical, subsequently switched to every 3-day injections of testosterone cypionate 60 mg q3  days.  Clayton told me October 2024 that he has been using the testosterone only irregularly, basically tapering off of the    He has been working with Dr. Trenton Parham at [facility] for testosterone replacement therapy.   As of November as of December 2022, it sounds like Clayton's dose is around 100 mg per 2 weeks     6-7-2022  Prostate Specific Antigen Screen 0.00 - 6.50 ug/L <0.10       Status post right total hip arthroplasty May 2019, then with heterotopic bone excision and psoas tendon release March 2020 at [place]     Nephrolithiasis, known to have stones residing in both kidneys, currently asymptomatic, microhematuria has been observed.    I reminded him about the importance of staying well-hydrated.  1 thing we might be giving up by dropping hydrochlorothiazide is that hydrochlorothiazide tends to decrease calcium excretion and is sometimes used to suppress formation of stones.  We decided to keep the hydrochlorothiazide going for that reason.     Glaucoma, on eyedrops     Satisfactory screening colonoscopy December 2017 with sigmoid diverticulosis, no polyps, recheck optional 5 years after that = 2022     Erectile dysfunction, on testosterone replacement rarely  uses high-dose sildenafil.      Immunization History   Administered Date(s) Administered    COVID-19 12+ (Pfizer) 10/24/2023, 10/04/2024    COVID-19 Bivalent 12+ (Pfizer) 09/23/2022    COVID-19 MONOVALENT 12+ (Pfizer) 12/28/2020, 01/18/2021, 09/03/2021    COVID-19 Monovalent 12+ (Pfizer 2022) 05/12/2022    Flu 65+ (Fluad) 10/18/2019, 10/04/2024    Flu, Unspecified 09/26/2017    Influenza (H1N1) 11/25/2009    Influenza (High Dose) Trivalent,PF (Fluzone) 10/18/2019    Influenza (IIV3) PF 10/15/2011, 09/27/2013    Influenza Vaccine 65+ (FLUAD) 09/07/2020, 09/21/2021, 11/09/2022, 10/24/2023    Influenza Vaccine >6 months,quad, PF 09/07/2020    Pneumo Conj 13-V (2010&after) 12/01/2020    Pneumococcal 23 valent 11/27/2013    RSV Vaccine (Abrysvo)  10/24/2023    TDAP (Adacel,Boostrix) 09/02/2011    Zoster recombinant adjuvanted (SHINGRIX) 12/01/2020, 02/19/2021    Zoster vaccine, live 10/27/2013         --------------------------------------------------------------------------------------------------------------------------  History of Present Illness  This 79 year old old       Reason for visit:  Occasional chest pain with SOB; recheck PSA, continued anxiety     He eats 2-3 servings of fruits and vegetables daily.He consumes 0 sweetened beverage(s) daily.He exercises with enough effort to increase his heart rate 9 or less minutes per day.  He exercises with enough effort to increase his heart rate 3 or less days per week.   He is taking medications regularly.      Wt Readings from Last 3 Encounters:   10/09/24 75.8 kg (167 lb)   03/01/24 119.9 kg (264 lb 4.8 oz)   10/04/23 74.4 kg (164 lb)     BP Readings from Last 3 Encounters:   10/09/24 118/70   03/01/24 116/62   10/04/23 114/60       ---------------------------------------------------------------------------------------------------------------------------    Medications, Allergies, Social, and Problem List       Current Outpatient Medications   Medication Sig Dispense Refill    ALPRAZolam (XANAX) 0.5 MG tablet Take 1 tablet (0.5 mg) by mouth nightly as needed for anxiety DO NOT TAKE WITHIN 12 HOURS OF ZOLPIDEM 30 tablet 1    atorvastatin (LIPITOR) 40 MG tablet Take 1 tablet (40 mg) by mouth daily 100 tablet 1    celecoxib (CELEBREX) 200 MG capsule Take 1 capsule (200 mg) by mouth 2 times daily 180 capsule 3    hydrochlorothiazide (HYDRODIURIL) 12.5 MG tablet Take 1 tablet (12.5 mg) by mouth daily 90 tablet 3    losartan (COZAAR) 50 MG tablet Take 1 tablet (50 mg) by mouth daily 100 tablet 1    zolpidem (AMBIEN) 5 MG tablet Take 1 tablet (5 mg) by mouth nightly as needed for sleep 60 tablet 0    hydrochlorothiazide (MICROZIDE) 12.5 MG capsule TAKE 1 CAPSULE (12.5 MG TOTAL) BY MOUTH DAILY. (Patient not  "taking: Reported on 10/9/2024) 90 capsule 1     Allergies   Allergen Reactions    Demerol [Meperidine] Nausea    Morphine (Pf) [Morphine] Nausea    Nitrous Oxide Nausea     Social History     Tobacco Use    Smoking status: Never     Passive exposure: Never    Smokeless tobacco: Never    Tobacco comments:     2 cigars per month   Vaping Use    Vaping status: Never Used   Substance Use Topics    Alcohol use: Yes     Comment: Alcoholic Drinks/day: occas    Drug use: No     Patient Active Problem List   Diagnosis    Calculus of ureter    Calculus of kidney    Essential hypertension    Hypercholesterolemia    Thoracic ascending aortic aneurysm (H)    Anxiety    Primary insomnia    S/P lumbar fusion    S/P cervical spinal fusion    Cervical radiculopathy    Raynaud phenomenon    Other male erectile dysfunction    Hypogonadism male    Class 2 severe obesity due to excess calories with serious comorbidity in adult (H)        Reviewed, reconciled and updated       Physical Exam   General Appearance:       /70 (BP Location: Right arm, Patient Position: Sitting, Cuff Size: Adult Regular)   Pulse 75   Temp 97.8  F (36.6  C)   Resp 16   Ht 1.753 m (5' 9\")   Wt 75.8 kg (167 lb)   SpO2 98%   BMI 24.66 kg/m      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  The, but he did note some diplopia on far lateral gaze.         Additional Information   I spent 30 minutes on this encounter, including reviewing interval history since last visit, examining the patient, explaining and counseling the issues enumerated in the Assessment and Plan (patient given a copy), ordering indicated tests, ordering prescriptions    The longitudinal plan of care for the diagnosis(es)/condition(s) as documented were addressed during this visit. Due to the added complexity in " care, I will continue to support Clayton in the subsequent management and with ongoing continuity of care.       MARIA A MERRILL MD, MD    Signed Electronically by: MARIA A MERRILL MD

## 2024-10-15 ENCOUNTER — HOSPITAL ENCOUNTER (OUTPATIENT)
Dept: CARDIOLOGY | Facility: CLINIC | Age: 79
Discharge: HOME | End: 2024-10-15
Attending: INTERNAL MEDICINE | Admitting: INTERNAL MEDICINE
Payer: COMMERCIAL

## 2024-10-15 DIAGNOSIS — R07.89 CHEST PRESSURE: ICD-10-CM

## 2024-10-15 DIAGNOSIS — R06.09 DOE (DYSPNEA ON EXERTION): ICD-10-CM

## 2024-10-15 LAB
CV STRESS CURRENT BP HE: NORMAL
CV STRESS CURRENT HR HE: 102
CV STRESS CURRENT HR HE: 102
CV STRESS CURRENT HR HE: 103
CV STRESS CURRENT HR HE: 105
CV STRESS CURRENT HR HE: 121
CV STRESS CURRENT HR HE: 122
CV STRESS CURRENT HR HE: 122
CV STRESS CURRENT HR HE: 126
CV STRESS CURRENT HR HE: 62
CV STRESS CURRENT HR HE: 67
CV STRESS CURRENT HR HE: 68
CV STRESS CURRENT HR HE: 69
CV STRESS CURRENT HR HE: 70
CV STRESS CURRENT HR HE: 72
CV STRESS CURRENT HR HE: 73
CV STRESS CURRENT HR HE: 73
CV STRESS CURRENT HR HE: 75
CV STRESS CURRENT HR HE: 81
CV STRESS CURRENT HR HE: 84
CV STRESS CURRENT HR HE: 87
CV STRESS CURRENT HR HE: 87
CV STRESS CURRENT HR HE: 88
CV STRESS CURRENT HR HE: 89
CV STRESS CURRENT HR HE: 94
CV STRESS CURRENT HR HE: 94
CV STRESS CURRENT HR HE: 95
CV STRESS CURRENT HR HE: 96
CV STRESS DEVIATION TIME HE: NORMAL
CV STRESS ECHO PERCENT HR HE: NORMAL
CV STRESS EXERCISE STAGE HE: NORMAL
CV STRESS EXERCISE STAGE REACHED HE: NORMAL
CV STRESS FINAL RESTING BP HE: NORMAL
CV STRESS FINAL RESTING HR HE: 67
CV STRESS MAX HR HE: 127
CV STRESS MAX TREADMILL GRADE HE: 16
CV STRESS MAX TREADMILL SPEED HE: 4.2
CV STRESS PEAK DIA BP HE: NORMAL
CV STRESS PEAK SYS BP HE: NORMAL
CV STRESS PHASE HE: NORMAL
CV STRESS PROTOCOL HE: NORMAL
CV STRESS REASON STOPPED HE: NORMAL
CV STRESS RESTING PT POSITION HE: NORMAL
CV STRESS RESTING PT POSITION HE: NORMAL
CV STRESS ST DEVIATION AMOUNT HE: NORMAL
CV STRESS ST DEVIATION ELEVATION HE: NORMAL
CV STRESS ST EVELATION AMOUNT HE: NORMAL
CV STRESS SYMPTOMS HE: NORMAL
CV STRESS TEST TYPE HE: NORMAL
CV STRESS TOTAL STAGE TIME MIN 1 HE: NORMAL
STRESS ECHO BASELINE DIASTOLIC HE: 87
STRESS ECHO BASELINE HR: NORMAL
STRESS ECHO BASELINE SYSTOLIC BP: 132
STRESS ECHO LAST STRESS DIASTOLIC BP: 68
STRESS ECHO LAST STRESS HR: 126
STRESS ECHO LAST STRESS SYSTOLIC BP: 150
STRESS ECHO POST ESTIMATED WORKLOAD: 12.1
STRESS ECHO POST EXERCISE DUR MIN: 11
STRESS ECHO POST EXERCISE DUR SEC: 0
STRESS ECHO TARGET HR: 122

## 2024-10-15 PROCEDURE — 93018 CV STRESS TEST I&R ONLY: CPT | Performed by: INTERNAL MEDICINE

## 2024-10-15 PROCEDURE — 93017 CV STRESS TEST TRACING ONLY: CPT

## 2024-10-15 PROCEDURE — 93016 CV STRESS TEST SUPVJ ONLY: CPT | Performed by: STUDENT IN AN ORGANIZED HEALTH CARE EDUCATION/TRAINING PROGRAM

## 2024-11-05 ENCOUNTER — TRANSFERRED RECORDS (OUTPATIENT)
Dept: HEALTH INFORMATION MANAGEMENT | Facility: CLINIC | Age: 79
End: 2024-11-05
Payer: COMMERCIAL

## 2024-11-19 ENCOUNTER — TELEPHONE (OUTPATIENT)
Dept: INTERNAL MEDICINE | Facility: CLINIC | Age: 79
End: 2024-11-19
Payer: COMMERCIAL

## 2024-11-19 NOTE — TELEPHONE ENCOUNTER
Patient Quality Outreach    Patient is due for the following:   Physical Annual Wellness Visit    Action(s) Taken:   Patient to call back and schedule an AWV    Type of outreach:    Sent [employer] message.    Questions for provider review:    None           Joy C Steinert, CMA

## 2024-11-19 NOTE — LETTER
November 19, 2024      Clayton Vaca  [address] [id number]      Your healthcare team cares about your health. To provide you with the best care, we have reviewed your chart and based on our findings, we see that you are due to:     PREVENTATIVE VISIT: Annual Medicare Wellness:Schedule an Annual Medicare Wellness Exam. Please call your [facility] clinic to set up your appointment.    If you have already completed these items, please contact the clinic via phone or Mychart so your care team can review and update your records.  Thank you for choosing [facility] for your healthcare needs. For any questions, concerns, or to schedule an appointment please contact the clinic.     Healthy Regards,    Your [facility] Care Team

## 2024-12-07 DIAGNOSIS — I10 ESSENTIAL HYPERTENSION: ICD-10-CM

## 2024-12-07 DIAGNOSIS — I25.10 CORONARY ARTERY DISEASE INVOLVING NATIVE HEART WITHOUT ANGINA PECTORIS, UNSPECIFIED VESSEL OR LESION TYPE: ICD-10-CM

## 2024-12-07 DIAGNOSIS — E78.00 HYPERCHOLESTEREMIA: ICD-10-CM

## 2024-12-09 RX ORDER — ATORVASTATIN CALCIUM 40 MG/1
40 TABLET, FILM COATED ORAL DAILY
Qty: 100 TABLET | Refills: 1 | Status: SHIPPED | OUTPATIENT
Start: 2024-12-09

## 2024-12-09 RX ORDER — HYDROCHLOROTHIAZIDE 12.5 MG/1
CAPSULE ORAL
Qty: 90 CAPSULE | Refills: 0 | OUTPATIENT
Start: 2024-12-09

## 2024-12-10 DIAGNOSIS — I10 ESSENTIAL HYPERTENSION: Primary | ICD-10-CM

## 2024-12-10 RX ORDER — HYDROCHLOROTHIAZIDE 12.5 MG/1
12.5 TABLET ORAL DAILY
Qty: 90 TABLET | Refills: 3 | Status: SHIPPED | OUTPATIENT
Start: 2024-12-10

## 2024-12-28 ENCOUNTER — HEALTH MAINTENANCE LETTER (OUTPATIENT)
Age: 79
End: 2024-12-28

## 2025-01-28 ENCOUNTER — TRANSFERRED RECORDS (OUTPATIENT)
Dept: HEALTH INFORMATION MANAGEMENT | Facility: CLINIC | Age: 80
End: 2025-01-28
Payer: COMMERCIAL

## 2025-03-20 DIAGNOSIS — F41.9 ANXIETY: ICD-10-CM

## 2025-03-20 DIAGNOSIS — F51.04 CHRONIC INSOMNIA: ICD-10-CM

## 2025-03-20 RX ORDER — ALPRAZOLAM 0.5 MG
0.5 TABLET ORAL
Qty: 30 TABLET | Refills: 1 | Status: SHIPPED | OUTPATIENT
Start: 2025-03-20

## 2025-03-20 RX ORDER — ZOLPIDEM TARTRATE 5 MG/1
5 TABLET ORAL
Qty: 60 TABLET | Refills: 0 | Status: SHIPPED | OUTPATIENT
Start: 2025-03-20

## 2025-04-02 ENCOUNTER — PATIENT OUTREACH (OUTPATIENT)
Dept: CARE COORDINATION | Facility: CLINIC | Age: 80
End: 2025-04-02
Payer: COMMERCIAL

## 2025-06-13 ENCOUNTER — ANCILLARY PROCEDURE (OUTPATIENT)
Dept: GENERAL RADIOLOGY | Facility: CLINIC | Age: 80
End: 2025-06-13
Attending: INTERNAL MEDICINE
Payer: COMMERCIAL

## 2025-06-13 DIAGNOSIS — R05.3 CHRONIC COUGH: ICD-10-CM

## 2025-06-13 PROBLEM — E66.01 CLASS 2 SEVERE OBESITY DUE TO EXCESS CALORIES WITH SERIOUS COMORBIDITY IN ADULT (H): Status: RESOLVED | Noted: 2024-03-07 | Resolved: 2025-06-13

## 2025-06-13 PROBLEM — E66.812 CLASS 2 SEVERE OBESITY DUE TO EXCESS CALORIES WITH SERIOUS COMORBIDITY IN ADULT (H): Status: RESOLVED | Noted: 2024-03-07 | Resolved: 2025-06-13

## 2025-06-13 PROCEDURE — 71046 X-RAY EXAM CHEST 2 VIEWS: CPT | Mod: TC | Performed by: RADIOLOGY

## 2025-06-14 ENCOUNTER — RESULTS FOLLOW-UP (OUTPATIENT)
Dept: INTERNAL MEDICINE | Facility: CLINIC | Age: 80
End: 2025-06-14

## 2025-07-08 RX ORDER — NITROGLYCERIN 0.4 MG/1
0.4 TABLET SUBLINGUAL
Status: DISCONTINUED | OUTPATIENT
Start: 2025-07-08 | End: 2025-07-16 | Stop reason: HOSPADM

## 2025-07-08 RX ORDER — METOPROLOL TARTRATE 1 MG/ML
5-20 INJECTION, SOLUTION INTRAVENOUS
Status: DISCONTINUED | OUTPATIENT
Start: 2025-07-08 | End: 2025-07-16 | Stop reason: HOSPADM

## 2025-07-15 ENCOUNTER — HOSPITAL ENCOUNTER (OUTPATIENT)
Dept: CT IMAGING | Facility: CLINIC | Age: 80
Discharge: HOME | End: 2025-07-15
Attending: INTERNAL MEDICINE
Payer: COMMERCIAL

## 2025-07-15 VITALS
DIASTOLIC BLOOD PRESSURE: 59 MMHG | HEART RATE: 65 BPM | RESPIRATION RATE: 16 BRPM | OXYGEN SATURATION: 97 % | SYSTOLIC BLOOD PRESSURE: 110 MMHG

## 2025-07-15 DIAGNOSIS — I71.21 ANEURYSM OF ASCENDING AORTA WITHOUT RUPTURE: ICD-10-CM

## 2025-07-15 DIAGNOSIS — I20.9 ANGINA PECTORIS: ICD-10-CM

## 2025-07-15 LAB
BSA FOR ECHO PROCEDURE: 1.9 M2
CCTA ASCENDING AORTA: 4
CCTA SINUS: 3.6
CREAT BLD-MCNC: 1.1 MG/DL (ref 0.7–1.2)
EGFRCR SERPLBLD CKD-EPI 2021: >60 ML/MIN/1.73M2

## 2025-07-15 PROCEDURE — 250N000011 HC RX IP 250 OP 636: Performed by: INTERNAL MEDICINE

## 2025-07-15 PROCEDURE — 75574 CT ANGIO HRT W/3D IMAGE: CPT | Mod: 26 | Performed by: GENERAL ACUTE CARE HOSPITAL

## 2025-07-15 PROCEDURE — 82565 ASSAY OF CREATININE: CPT

## 2025-07-15 PROCEDURE — 75574 CT ANGIO HRT W/3D IMAGE: CPT

## 2025-07-15 PROCEDURE — 250N000009 HC RX 250: Performed by: INTERNAL MEDICINE

## 2025-07-15 PROCEDURE — 250N000013 HC RX MED GY IP 250 OP 250 PS 637: Performed by: INTERNAL MEDICINE

## 2025-07-15 RX ORDER — IOPAMIDOL 755 MG/ML
90 INJECTION, SOLUTION INTRAVASCULAR ONCE
Status: COMPLETED | OUTPATIENT
Start: 2025-07-15 | End: 2025-07-15

## 2025-07-15 RX ADMIN — METOPROLOL TARTRATE 5 MG: 1 INJECTION, SOLUTION INTRAVENOUS at 13:19

## 2025-07-15 RX ADMIN — NITROGLYCERIN 0.8 MG: 0.4 TABLET SUBLINGUAL at 13:20

## 2025-07-15 RX ADMIN — IOPAMIDOL 90 ML: 755 INJECTION, SOLUTION INTRAVENOUS at 13:32

## 2025-08-10 DIAGNOSIS — I25.10 CORONARY ARTERY DISEASE INVOLVING NATIVE HEART WITHOUT ANGINA PECTORIS, UNSPECIFIED VESSEL OR LESION TYPE: ICD-10-CM

## 2025-08-10 DIAGNOSIS — E78.00 HYPERCHOLESTEREMIA: ICD-10-CM

## 2025-08-11 RX ORDER — ATORVASTATIN CALCIUM 40 MG/1
40 TABLET, FILM COATED ORAL DAILY
Qty: 90 TABLET | Refills: 2 | Status: SHIPPED | OUTPATIENT
Start: 2025-08-11